# Patient Record
Sex: FEMALE | Race: WHITE | Employment: UNEMPLOYED | URBAN - METROPOLITAN AREA
[De-identification: names, ages, dates, MRNs, and addresses within clinical notes are randomized per-mention and may not be internally consistent; named-entity substitution may affect disease eponyms.]

---

## 2022-08-19 ENCOUNTER — APPOINTMENT (OUTPATIENT)
Dept: GENERAL RADIOLOGY | Age: 22
DRG: 197 | End: 2022-08-19
Attending: EMERGENCY MEDICINE
Payer: MEDICAID

## 2022-08-19 ENCOUNTER — HOSPITAL ENCOUNTER (INPATIENT)
Age: 22
LOS: 7 days | Discharge: HOME HEALTH | DRG: 197 | End: 2022-08-26
Attending: EMERGENCY MEDICINE | Admitting: FAMILY MEDICINE
Payer: MEDICAID

## 2022-08-19 DIAGNOSIS — L03.119 CELLULITIS OF LOWER EXTREMITY, UNSPECIFIED LATERALITY: Primary | ICD-10-CM

## 2022-08-19 DIAGNOSIS — R53.81 DEBILITY: ICD-10-CM

## 2022-08-19 DIAGNOSIS — E66.01 MORBID OBESITY WITH BODY MASS INDEX OF 70 AND OVER IN ADULT (HCC): ICD-10-CM

## 2022-08-19 DIAGNOSIS — S81.809A MULTIPLE OPEN WOUNDS OF LOWER LEG, INITIAL ENCOUNTER: ICD-10-CM

## 2022-08-19 PROBLEM — L03.115 CELLULITIS AND ABSCESS OF RIGHT LEG: Status: ACTIVE | Noted: 2022-08-19

## 2022-08-19 PROBLEM — R73.9 HYPERGLYCEMIA: Status: ACTIVE | Noted: 2022-08-19

## 2022-08-19 PROBLEM — L02.416 CELLULITIS AND ABSCESS OF LEFT LEG: Status: ACTIVE | Noted: 2022-08-19

## 2022-08-19 PROBLEM — F41.9 SEVERE ANXIETY: Status: ACTIVE | Noted: 2022-08-19

## 2022-08-19 PROBLEM — E87.1 HYPONATREMIA: Status: ACTIVE | Noted: 2022-08-19

## 2022-08-19 PROBLEM — L03.116 BILATERAL LOWER LEG CELLULITIS: Status: ACTIVE | Noted: 2022-08-19

## 2022-08-19 PROBLEM — L03.115 BILATERAL LOWER LEG CELLULITIS: Status: ACTIVE | Noted: 2022-08-19

## 2022-08-19 PROBLEM — L03.116 CELLULITIS AND ABSCESS OF LEFT LEG: Status: ACTIVE | Noted: 2022-08-19

## 2022-08-19 PROBLEM — I10 HTN (HYPERTENSION): Status: ACTIVE | Noted: 2022-08-19

## 2022-08-19 PROBLEM — L02.415 CELLULITIS AND ABSCESS OF RIGHT LEG: Status: ACTIVE | Noted: 2022-08-19

## 2022-08-19 PROBLEM — N17.9 AKI (ACUTE KIDNEY INJURY) (HCC): Status: ACTIVE | Noted: 2022-08-19

## 2022-08-19 LAB
ALBUMIN SERPL-MCNC: 2.8 G/DL (ref 3.4–5)
ALBUMIN/GLOB SERPL: 0.5 {RATIO} (ref 0.8–1.7)
ALP SERPL-CCNC: 100 U/L (ref 45–117)
ALT SERPL-CCNC: 57 U/L (ref 13–56)
ANION GAP SERPL CALC-SCNC: 7 MMOL/L (ref 3–18)
AST SERPL-CCNC: 41 U/L (ref 10–38)
BASOPHILS # BLD: 0.1 K/UL (ref 0–0.1)
BASOPHILS NFR BLD: 1 % (ref 0–2)
BILIRUB SERPL-MCNC: 0.5 MG/DL (ref 0.2–1)
BUN SERPL-MCNC: 31 MG/DL (ref 7–18)
BUN/CREAT SERPL: 18 (ref 12–20)
CALCIUM SERPL-MCNC: 9.6 MG/DL (ref 8.5–10.1)
CHLORIDE SERPL-SCNC: 106 MMOL/L (ref 100–111)
CK SERPL-CCNC: 159 U/L (ref 26–192)
CO2 SERPL-SCNC: 20 MMOL/L (ref 21–32)
CREAT SERPL-MCNC: 1.73 MG/DL (ref 0.6–1.3)
DIFFERENTIAL METHOD BLD: ABNORMAL
EOSINOPHIL # BLD: 0.1 K/UL (ref 0–0.4)
EOSINOPHIL NFR BLD: 1 % (ref 0–5)
ERYTHROCYTE [DISTWIDTH] IN BLOOD BY AUTOMATED COUNT: 16.4 % (ref 11.6–14.5)
GLOBULIN SER CALC-MCNC: 5.5 G/DL (ref 2–4)
GLUCOSE SERPL-MCNC: 131 MG/DL (ref 74–99)
HCT VFR BLD AUTO: 34.4 % (ref 35–45)
HGB BLD-MCNC: 10.7 G/DL (ref 12–16)
IMM GRANULOCYTES # BLD AUTO: 0.1 K/UL (ref 0–0.04)
IMM GRANULOCYTES NFR BLD AUTO: 1 % (ref 0–0.5)
LACTATE BLD-SCNC: 1.03 MMOL/L (ref 0.4–2)
LACTATE SERPL-SCNC: 1.8 MMOL/L (ref 0.4–2)
LYMPHOCYTES # BLD: 1.6 K/UL (ref 0.9–3.6)
LYMPHOCYTES NFR BLD: 13 % (ref 21–52)
MCH RBC QN AUTO: 25.7 PG (ref 24–34)
MCHC RBC AUTO-ENTMCNC: 31.1 G/DL (ref 31–37)
MCV RBC AUTO: 82.7 FL (ref 78–100)
MONOCYTES # BLD: 0.7 K/UL (ref 0.05–1.2)
MONOCYTES NFR BLD: 6 % (ref 3–10)
NEUTS SEG # BLD: 9.8 K/UL (ref 1.8–8)
NEUTS SEG NFR BLD: 79 % (ref 40–73)
NRBC # BLD: 0 K/UL (ref 0–0.01)
NRBC BLD-RTO: 0 PER 100 WBC
PHOSPHATE SERPL-MCNC: 4.1 MG/DL (ref 2.5–4.9)
PLATELET # BLD AUTO: 468 K/UL (ref 135–420)
PMV BLD AUTO: 10.1 FL (ref 9.2–11.8)
POTASSIUM SERPL-SCNC: 4.9 MMOL/L (ref 3.5–5.5)
PROT SERPL-MCNC: 8.3 G/DL (ref 6.4–8.2)
RBC # BLD AUTO: 4.16 M/UL (ref 4.2–5.3)
SODIUM SERPL-SCNC: 133 MMOL/L (ref 136–145)
WBC # BLD AUTO: 12.4 K/UL (ref 4.6–13.2)

## 2022-08-19 PROCEDURE — 74011250636 HC RX REV CODE- 250/636: Performed by: EMERGENCY MEDICINE

## 2022-08-19 PROCEDURE — 83605 ASSAY OF LACTIC ACID: CPT

## 2022-08-19 PROCEDURE — 93005 ELECTROCARDIOGRAM TRACING: CPT

## 2022-08-19 PROCEDURE — 80053 COMPREHEN METABOLIC PANEL: CPT

## 2022-08-19 PROCEDURE — 96375 TX/PRO/DX INJ NEW DRUG ADDON: CPT

## 2022-08-19 PROCEDURE — 80061 LIPID PANEL: CPT

## 2022-08-19 PROCEDURE — 82550 ASSAY OF CK (CPK): CPT

## 2022-08-19 PROCEDURE — 84100 ASSAY OF PHOSPHORUS: CPT

## 2022-08-19 PROCEDURE — 73590 X-RAY EXAM OF LOWER LEG: CPT

## 2022-08-19 PROCEDURE — 87205 SMEAR GRAM STAIN: CPT

## 2022-08-19 PROCEDURE — 94762 N-INVAS EAR/PLS OXIMTRY CONT: CPT

## 2022-08-19 PROCEDURE — 65270000029 HC RM PRIVATE

## 2022-08-19 PROCEDURE — 83036 HEMOGLOBIN GLYCOSYLATED A1C: CPT

## 2022-08-19 PROCEDURE — 86141 C-REACTIVE PROTEIN HS: CPT

## 2022-08-19 PROCEDURE — 74011000258 HC RX REV CODE- 258: Performed by: EMERGENCY MEDICINE

## 2022-08-19 PROCEDURE — 99285 EMERGENCY DEPT VISIT HI MDM: CPT

## 2022-08-19 PROCEDURE — 87040 BLOOD CULTURE FOR BACTERIA: CPT

## 2022-08-19 PROCEDURE — 96374 THER/PROPH/DIAG INJ IV PUSH: CPT

## 2022-08-19 PROCEDURE — 71045 X-RAY EXAM CHEST 1 VIEW: CPT

## 2022-08-19 PROCEDURE — 85025 COMPLETE CBC W/AUTO DIFF WBC: CPT

## 2022-08-19 PROCEDURE — 87077 CULTURE AEROBIC IDENTIFY: CPT

## 2022-08-19 PROCEDURE — 87186 SC STD MICRODIL/AGAR DIL: CPT

## 2022-08-19 RX ORDER — CLINDAMYCIN PHOSPHATE 600 MG/50ML
600 INJECTION INTRAVENOUS EVERY 8 HOURS
Status: DISCONTINUED | OUTPATIENT
Start: 2022-08-19 | End: 2022-08-20

## 2022-08-19 RX ORDER — DEXTROSE MONOHYDRATE 100 MG/ML
0-250 INJECTION, SOLUTION INTRAVENOUS AS NEEDED
Status: DISCONTINUED | OUTPATIENT
Start: 2022-08-19 | End: 2022-08-26 | Stop reason: HOSPADM

## 2022-08-19 RX ORDER — ONDANSETRON 2 MG/ML
4 INJECTION INTRAMUSCULAR; INTRAVENOUS
Status: COMPLETED | OUTPATIENT
Start: 2022-08-19 | End: 2022-08-19

## 2022-08-19 RX ORDER — SODIUM CHLORIDE 0.9 % (FLUSH) 0.9 %
5-40 SYRINGE (ML) INJECTION AS NEEDED
Status: DISCONTINUED | OUTPATIENT
Start: 2022-08-19 | End: 2022-08-26 | Stop reason: HOSPADM

## 2022-08-19 RX ORDER — OXYCODONE HYDROCHLORIDE 5 MG/1
10 TABLET ORAL
Status: DISCONTINUED | OUTPATIENT
Start: 2022-08-19 | End: 2022-08-26 | Stop reason: HOSPADM

## 2022-08-19 RX ORDER — ACETAMINOPHEN 325 MG/1
650 TABLET ORAL
Status: DISCONTINUED | OUTPATIENT
Start: 2022-08-19 | End: 2022-08-26 | Stop reason: HOSPADM

## 2022-08-19 RX ORDER — METOPROLOL TARTRATE 25 MG/1
12.5 TABLET, FILM COATED ORAL EVERY 12 HOURS
Status: DISCONTINUED | OUTPATIENT
Start: 2022-08-19 | End: 2022-08-26 | Stop reason: HOSPADM

## 2022-08-19 RX ORDER — POLYETHYLENE GLYCOL 3350 17 G/17G
17 POWDER, FOR SOLUTION ORAL DAILY PRN
Status: DISCONTINUED | OUTPATIENT
Start: 2022-08-19 | End: 2022-08-26 | Stop reason: HOSPADM

## 2022-08-19 RX ORDER — INSULIN LISPRO 100 [IU]/ML
INJECTION, SOLUTION INTRAVENOUS; SUBCUTANEOUS
Status: DISCONTINUED | OUTPATIENT
Start: 2022-08-19 | End: 2022-08-21

## 2022-08-19 RX ORDER — VANCOMYCIN 2 GRAM/500 ML IN 0.9 % SODIUM CHLORIDE INTRAVENOUS
2000 ONCE
Status: COMPLETED | OUTPATIENT
Start: 2022-08-19 | End: 2022-08-20

## 2022-08-19 RX ORDER — ONDANSETRON 2 MG/ML
4 INJECTION INTRAMUSCULAR; INTRAVENOUS
Status: DISCONTINUED | OUTPATIENT
Start: 2022-08-19 | End: 2022-08-26 | Stop reason: HOSPADM

## 2022-08-19 RX ORDER — TRAZODONE HYDROCHLORIDE 50 MG/1
50 TABLET ORAL
Status: DISCONTINUED | OUTPATIENT
Start: 2022-08-19 | End: 2022-08-26 | Stop reason: HOSPADM

## 2022-08-19 RX ORDER — MAGNESIUM SULFATE 100 %
16 CRYSTALS MISCELLANEOUS AS NEEDED
Status: DISCONTINUED | OUTPATIENT
Start: 2022-08-19 | End: 2022-08-26 | Stop reason: HOSPADM

## 2022-08-19 RX ORDER — ACETAMINOPHEN 650 MG/1
650 SUPPOSITORY RECTAL
Status: DISCONTINUED | OUTPATIENT
Start: 2022-08-19 | End: 2022-08-26 | Stop reason: HOSPADM

## 2022-08-19 RX ORDER — MORPHINE SULFATE 4 MG/ML
8 INJECTION INTRAVENOUS ONCE
Status: COMPLETED | OUTPATIENT
Start: 2022-08-19 | End: 2022-08-19

## 2022-08-19 RX ORDER — ONDANSETRON 4 MG/1
4 TABLET, ORALLY DISINTEGRATING ORAL
Status: DISCONTINUED | OUTPATIENT
Start: 2022-08-19 | End: 2022-08-26 | Stop reason: HOSPADM

## 2022-08-19 RX ORDER — ENOXAPARIN SODIUM 100 MG/ML
40 INJECTION SUBCUTANEOUS DAILY
Status: DISCONTINUED | OUTPATIENT
Start: 2022-08-20 | End: 2022-08-20

## 2022-08-19 RX ORDER — HYDRALAZINE HYDROCHLORIDE 20 MG/ML
10 INJECTION INTRAMUSCULAR; INTRAVENOUS
Status: DISCONTINUED | OUTPATIENT
Start: 2022-08-19 | End: 2022-08-26 | Stop reason: HOSPADM

## 2022-08-19 RX ORDER — SODIUM CHLORIDE 9 MG/ML
100 INJECTION, SOLUTION INTRAVENOUS CONTINUOUS
Status: DISCONTINUED | OUTPATIENT
Start: 2022-08-19 | End: 2022-08-26

## 2022-08-19 RX ORDER — SODIUM CHLORIDE 0.9 % (FLUSH) 0.9 %
5-40 SYRINGE (ML) INJECTION EVERY 8 HOURS
Status: DISCONTINUED | OUTPATIENT
Start: 2022-08-19 | End: 2022-08-26 | Stop reason: HOSPADM

## 2022-08-19 RX ORDER — CITALOPRAM 10 MG/1
10 TABLET ORAL DAILY
Status: DISCONTINUED | OUTPATIENT
Start: 2022-08-20 | End: 2022-08-26 | Stop reason: HOSPADM

## 2022-08-19 RX ORDER — MORPHINE SULFATE 2 MG/ML
2 INJECTION, SOLUTION INTRAMUSCULAR; INTRAVENOUS
Status: DISCONTINUED | OUTPATIENT
Start: 2022-08-19 | End: 2022-08-26 | Stop reason: HOSPADM

## 2022-08-19 RX ADMIN — ONDANSETRON 4 MG: 2 INJECTION INTRAMUSCULAR; INTRAVENOUS at 20:44

## 2022-08-19 RX ADMIN — MORPHINE SULFATE 8 MG: 4 INJECTION INTRAVENOUS at 20:41

## 2022-08-19 RX ADMIN — PIPERACILLIN AND TAZOBACTAM 4.5 G: 4; .5 INJECTION, POWDER, FOR SOLUTION INTRAVENOUS at 20:45

## 2022-08-19 RX ADMIN — CLINDAMYCIN IN 5 PERCENT DEXTROSE 600 MG: 12 INJECTION, SOLUTION INTRAVENOUS at 21:30

## 2022-08-19 RX ADMIN — VANCOMYCIN HYDROCHLORIDE 2000 MG: 10 INJECTION, POWDER, LYOPHILIZED, FOR SOLUTION INTRAVENOUS at 22:13

## 2022-08-19 RX ADMIN — SODIUM CHLORIDE 1000 ML: 9 INJECTION, SOLUTION INTRAVENOUS at 20:38

## 2022-08-19 NOTE — ED NOTES
Patient aware of urine sample needed. Unable to void at current time. Will re attempt to collect at a later time. Yu Loving applied at this time.

## 2022-08-19 NOTE — ED NOTES
Verbal shift change report given to Casey Hartley RN (oncoming nurse). Report included the following information SBAR, ED Summary and MAR.

## 2022-08-19 NOTE — Clinical Note
Status[de-identified] INPATIENT [101]   Type of Bed: Medical [8]   Cardiac Monitoring Required?: No   Inpatient Hospitalization Certified Necessary for the Following Reasons: 3.  Patient receiving treatment that can only be provided in an inpatient setting (further clarification in H&P documentation)   Admitting Diagnosis: Cellulitis and abscess of right leg [[id number]]   Admitting Diagnosis: Cellulitis and abscess of left leg [[id number]]   Admitting Physician: Princess Cherry [[id number]]   Attending Physician: Princess Cherry [[id number]]   Estimated Length of Stay: 2 Midnights   Discharge Plan[de-identified] Home with Office Follow-up

## 2022-08-19 NOTE — ED TRIAGE NOTES
Pt arrives to ed bilateral leg pain that has been going on for months now.  Pt states there is sores on her legs that have not been able to heal.

## 2022-08-20 LAB
ALBUMIN SERPL-MCNC: 2 G/DL (ref 3.4–5)
ALBUMIN/GLOB SERPL: 0.4 {RATIO} (ref 0.8–1.7)
ALP SERPL-CCNC: 76 U/L (ref 45–117)
ALT SERPL-CCNC: 37 U/L (ref 13–56)
ANION GAP SERPL CALC-SCNC: 9 MMOL/L (ref 3–18)
AST SERPL-CCNC: 26 U/L (ref 10–38)
BILIRUB SERPL-MCNC: 1.1 MG/DL (ref 0.2–1)
BUN SERPL-MCNC: 21 MG/DL (ref 7–18)
BUN/CREAT SERPL: 19 (ref 12–20)
CALCIUM SERPL-MCNC: 8.4 MG/DL (ref 8.5–10.1)
CHLORIDE SERPL-SCNC: 111 MMOL/L (ref 100–111)
CHOLEST SERPL-MCNC: 235 MG/DL
CO2 SERPL-SCNC: 18 MMOL/L (ref 21–32)
CREAT SERPL-MCNC: 1.09 MG/DL (ref 0.6–1.3)
CRP SERPL HS-MCNC: >9.5 MG/L
ERYTHROCYTE [DISTWIDTH] IN BLOOD BY AUTOMATED COUNT: 16.6 % (ref 11.6–14.5)
GLOBULIN SER CALC-MCNC: 5 G/DL (ref 2–4)
GLUCOSE BLD STRIP.AUTO-MCNC: 103 MG/DL (ref 70–110)
GLUCOSE BLD STRIP.AUTO-MCNC: 109 MG/DL (ref 70–110)
GLUCOSE BLD STRIP.AUTO-MCNC: 113 MG/DL (ref 70–110)
GLUCOSE BLD STRIP.AUTO-MCNC: 80 MG/DL (ref 70–110)
GLUCOSE BLD STRIP.AUTO-MCNC: 97 MG/DL (ref 70–110)
GLUCOSE SERPL-MCNC: 83 MG/DL (ref 74–99)
HBA1C MFR BLD: 5.4 % (ref 4.2–5.6)
HCT VFR BLD AUTO: 29.7 % (ref 35–45)
HDLC SERPL-MCNC: 30 MG/DL (ref 40–60)
HDLC SERPL: 7.8 {RATIO} (ref 0–5)
HGB BLD-MCNC: 8.9 G/DL (ref 12–16)
LDLC SERPL CALC-MCNC: 168 MG/DL (ref 0–100)
LIPID PROFILE,FLP: ABNORMAL
MCH RBC QN AUTO: 25.5 PG (ref 24–34)
MCHC RBC AUTO-ENTMCNC: 30 G/DL (ref 31–37)
MCV RBC AUTO: 85.1 FL (ref 78–100)
NRBC # BLD: 0 K/UL (ref 0–0.01)
NRBC BLD-RTO: 0 PER 100 WBC
PLATELET # BLD AUTO: 352 K/UL (ref 135–420)
PMV BLD AUTO: 9.7 FL (ref 9.2–11.8)
POTASSIUM SERPL-SCNC: 4.4 MMOL/L (ref 3.5–5.5)
PROT SERPL-MCNC: 7 G/DL (ref 6.4–8.2)
RBC # BLD AUTO: 3.49 M/UL (ref 4.2–5.3)
SODIUM SERPL-SCNC: 138 MMOL/L (ref 136–145)
TRIGL SERPL-MCNC: 185 MG/DL (ref ?–150)
VLDLC SERPL CALC-MCNC: 37 MG/DL
WBC # BLD AUTO: 12.7 K/UL (ref 4.6–13.2)

## 2022-08-20 PROCEDURE — 80053 COMPREHEN METABOLIC PANEL: CPT

## 2022-08-20 PROCEDURE — 87252 VIRUS INOCULATION TISSUE: CPT

## 2022-08-20 PROCEDURE — 65270000029 HC RM PRIVATE

## 2022-08-20 PROCEDURE — 74011250637 HC RX REV CODE- 250/637: Performed by: FAMILY MEDICINE

## 2022-08-20 PROCEDURE — 74011250637 HC RX REV CODE- 250/637: Performed by: INTERNAL MEDICINE

## 2022-08-20 PROCEDURE — 82962 GLUCOSE BLOOD TEST: CPT

## 2022-08-20 PROCEDURE — 87389 HIV-1 AG W/HIV-1&-2 AB AG IA: CPT

## 2022-08-20 PROCEDURE — 74011250636 HC RX REV CODE- 250/636: Performed by: FAMILY MEDICINE

## 2022-08-20 PROCEDURE — 74011250636 HC RX REV CODE- 250/636: Performed by: INTERNAL MEDICINE

## 2022-08-20 PROCEDURE — 74011000258 HC RX REV CODE- 258: Performed by: INTERNAL MEDICINE

## 2022-08-20 PROCEDURE — 36415 COLL VENOUS BLD VENIPUNCTURE: CPT

## 2022-08-20 PROCEDURE — 85027 COMPLETE CBC AUTOMATED: CPT

## 2022-08-20 PROCEDURE — 74011000250 HC RX REV CODE- 250: Performed by: FAMILY MEDICINE

## 2022-08-20 PROCEDURE — 74011000258 HC RX REV CODE- 258: Performed by: EMERGENCY MEDICINE

## 2022-08-20 PROCEDURE — 74011250636 HC RX REV CODE- 250/636: Performed by: EMERGENCY MEDICINE

## 2022-08-20 RX ORDER — ENOXAPARIN SODIUM 100 MG/ML
60 INJECTION SUBCUTANEOUS 2 TIMES DAILY
Status: DISCONTINUED | OUTPATIENT
Start: 2022-08-20 | End: 2022-08-22

## 2022-08-20 RX ORDER — NYSTATIN 100000 [USP'U]/G
POWDER TOPICAL 2 TIMES DAILY
Status: DISCONTINUED | OUTPATIENT
Start: 2022-08-20 | End: 2022-08-26 | Stop reason: HOSPADM

## 2022-08-20 RX ADMIN — SODIUM CHLORIDE 3 G: 900 INJECTION INTRAVENOUS at 17:58

## 2022-08-20 RX ADMIN — SODIUM CHLORIDE 100 ML/HR: 9 INJECTION, SOLUTION INTRAVENOUS at 23:19

## 2022-08-20 RX ADMIN — SODIUM CHLORIDE 3 G: 900 INJECTION INTRAVENOUS at 12:57

## 2022-08-20 RX ADMIN — SODIUM CHLORIDE, PRESERVATIVE FREE 10 ML: 5 INJECTION INTRAVENOUS at 21:43

## 2022-08-20 RX ADMIN — SODIUM CHLORIDE, PRESERVATIVE FREE 10 ML: 5 INJECTION INTRAVENOUS at 09:25

## 2022-08-20 RX ADMIN — METOPROLOL TARTRATE 12.5 MG: 25 TABLET, FILM COATED ORAL at 09:23

## 2022-08-20 RX ADMIN — SODIUM CHLORIDE, PRESERVATIVE FREE 10 ML: 5 INJECTION INTRAVENOUS at 03:27

## 2022-08-20 RX ADMIN — CITALOPRAM HYDROBROMIDE 10 MG: 10 TABLET ORAL at 09:23

## 2022-08-20 RX ADMIN — SODIUM CHLORIDE 100 ML/HR: 9 INJECTION, SOLUTION INTRAVENOUS at 04:23

## 2022-08-20 RX ADMIN — PIPERACILLIN AND TAZOBACTAM 4.5 G: 4; .5 INJECTION, POWDER, FOR SOLUTION INTRAVENOUS at 03:27

## 2022-08-20 RX ADMIN — SODIUM CHLORIDE, PRESERVATIVE FREE 10 ML: 5 INJECTION INTRAVENOUS at 14:40

## 2022-08-20 RX ADMIN — VANCOMYCIN HYDROCHLORIDE 1000 MG: 1 INJECTION, POWDER, LYOPHILIZED, FOR SOLUTION INTRAVENOUS at 09:29

## 2022-08-20 RX ADMIN — ENOXAPARIN SODIUM: 100 INJECTION SUBCUTANEOUS at 20:30

## 2022-08-20 RX ADMIN — NYSTATIN: 100000 POWDER TOPICAL at 12:58

## 2022-08-20 RX ADMIN — SODIUM CHLORIDE 100 ML/HR: 9 INJECTION, SOLUTION INTRAVENOUS at 06:36

## 2022-08-20 RX ADMIN — ENOXAPARIN SODIUM 40 MG: 100 INJECTION SUBCUTANEOUS at 09:24

## 2022-08-20 RX ADMIN — CLINDAMYCIN IN 5 PERCENT DEXTROSE 600 MG: 12 INJECTION, SOLUTION INTRAVENOUS at 04:16

## 2022-08-20 RX ADMIN — NYSTATIN: 100000 POWDER TOPICAL at 20:29

## 2022-08-20 RX ADMIN — PIPERACILLIN AND TAZOBACTAM 4.5 G: 4; .5 INJECTION, POWDER, FOR SOLUTION INTRAVENOUS at 09:23

## 2022-08-20 RX ADMIN — SODIUM CHLORIDE 3 G: 900 INJECTION INTRAVENOUS at 23:18

## 2022-08-20 RX ADMIN — METOPROLOL TARTRATE 12.5 MG: 25 TABLET, FILM COATED ORAL at 02:17

## 2022-08-20 RX ADMIN — METOPROLOL TARTRATE: 25 TABLET, FILM COATED ORAL at 20:29

## 2022-08-20 NOTE — H&P
History & Physical    Patient: Annika Burton MRN: [id number]  CSN: [id number]    YOB: 2000  Age: 25 y.o. Sex: female      DOA: 8/19/2022  Primary Care Provider:  None      Assessment/Plan     Patient Active Problem List   Diagnosis Code    Super-super obese ([facility] E66.01    Bilateral lower leg cellulitis L03.116, L03.115    Hyperglycemia R73.9    Hyponatremia E87.1    GUCCI (acute kidney injury) ([facility] N17.9    Severe anxiety F41.9    HTN (hypertension) I10    Multiple open wounds of lower leg, initial encounter S80.5A     20-year-old female with super morbid obesity and severe anxiety who has difficulty leaving her house is admitted for bilateral lower leg cellulitis.       Bilateral lower leg cellulitis with multiple open wounds of her legs-see images in separate progress note  -Vancomycin and Zosyn for antibiotic treatment  --Wound care consult  --Echocardiogram to assess ejection fraction given marked leg swelling  --Duplex dopplers of the lower extremities to rule out DVT  --Follow up blood and wound cultures    GUCCI-likely due to infection  - IV fluid hydration, trend creatinine  -Avoid nephrotoxins    Severe anxiety-she does not leave her house  -Case management consult for home health wound care    Hypertension  -Start Lopressor twice daily    Hyperglycemia-suspect diabetes  -Follow-up hemoglobin A1c  - Sliding scale insulin    Hyponatremia  --Follow trend    Super morbid obesity-BMI 84  -Bariatric surgery recommended  -Sleep study recommended to evaluate for sleep apnea    Full code    Prophylaxis: lovenox SQ    Estimated length of stay : 3 nights    Yasmany De MD  Nocturnist  -----------------------------------------------------------------------------------------------------------------------------------------------------------------------------------  CC: weeping leg wounds       HPI:     Annika Burton is a 25 y.o. female with super morbid obesity and severe anxiety who has difficulty leaving her house presents to the ED with her sister for weeping leg wounds for the past several months. They are painful and she has had increased redness. She lives with her mother. Her sister has been trying to clean the wounds with diluted bleach. She has not been to see a doctor in 3 years. She denies fever, cough, or nausea/vomiting. Past Medical History:   Diagnosis Date    Severe anxiety 2022    Super-super obese ([address]        History reviewed. No pertinent surgical history. Family History   Problem Relation Age of Onset    Hypertension Mother     Anxiety disorder Mother        Social History     Socioeconomic History    Marital status: SINGLE       Prior to Admission medications    Not on File       No Known Allergies    Review of Systems  Gen: No fever, chills, malaise, weight loss/gain. Heent: No headache, rhinorrhea, sore throat. Heart: No chest pain, palpitations, SAMPSON, pnd, or orthopnea. Resp: No cough, hemoptysis, wheezing and shortness of breath. GI: No nausea, vomiting, diarrhea, constipation, melena or hematochezia. : No urinary obstruction, dysuria or hematuria. Derm: Weeping leg wounds  Musc/skeletal: no bone or joint complaints. Vasc: +lower leg edema, cyanosis or claudication. Endo: No heat/cold intolerance, no polyuria,polydipsia or polyphagia. Neuro: No unilateral weakness, numbness, tingling. No seizures. Heme: No easy bruising or bleeding. Physical Exam:     Physical Exam:  Visit Vitals  BP (!) 160/58   Pulse (!) 110   Temp 98.1 °F (36.7 °C)   Resp 14   Ht 5' 6\" (1.676 m)   Wt (!) 237.4 kg (523 lb 7 oz)   SpO2 100%   BMI 84.49 kg/m²           Temp (24hrs), Av.1 °F (36.7 °C), Min:98.1 °F (36.7 °C), Max:98.1 °F (36.7 °C)    No intake/output data recorded. No intake/output data recorded. General:  Awake, cooperative, no distress. Head:  Normocephalic, without obvious abnormality, atraumatic.    Eyes:  Conjunctivae/corneas clear, sclera anicteric. Neck: Supple, symmetrical, trachea midline. Lungs:   Clear to auscultation bilaterally. Heart:  Tachycardic, regular rhythm, S1, S2 normal, no murmur, click, rub or gallop. Abdomen: Soft, very obese, nontender. Bowel sounds normal. No masses,  No organomegaly. Extremities: Extremities with severe swelling below the knees with erythema and multiple weeping open wounds. Capillary refill normal.   Pulses: 2+ and symmetric all extremities. Skin: Skin color pink, turgor normal. Multiple weeping leg wounds   Neurologic: No focal motor or sensory deficit. Labs Reviewed: All lab results for the last 24 hours reviewed. Recent Results (from the past 24 hour(s))   EKG, 12 LEAD, INITIAL    Collection Time: 08/19/22  6:20 PM   Result Value Ref Range    Ventricular Rate 118 BPM    Atrial Rate 118 BPM    P-R Interval 132 ms    QRS Duration 90 ms    Q-T Interval 308 ms    QTC Calculation (Bezet) 431 ms    Calculated P Axis 33 degrees    Calculated R Axis 58 degrees    Calculated T Axis 29 degrees    Diagnosis       Sinus tachycardia  Otherwise normal ECG  No previous ECGs available     CBC WITH AUTOMATED DIFF    Collection Time: 08/19/22  6:54 PM   Result Value Ref Range    WBC 12.4 4.6 - 13.2 K/uL    RBC 4.16 (L) 4.20 - 5.30 M/uL    HGB 10.7 (L) 12.0 - 16.0 g/dL    HCT 34.4 (L) 35.0 - 45.0 %    MCV 82.7 78.0 - 100.0 FL    MCH 25.7 24.0 - 34.0 PG    MCHC 31.1 31.0 - 37.0 g/dL    RDW 16.4 (H) 11.6 - 14.5 %    PLATELET 121 (H) 682 - 420 K/uL    MPV 10.1 9.2 - 11.8 FL    NRBC 0.0 0  WBC    ABSOLUTE NRBC 0.00 0.00 - 0.01 K/uL    NEUTROPHILS 79 (H) 40 - 73 %    LYMPHOCYTES 13 (L) 21 - 52 %    MONOCYTES 6 3 - 10 %    EOSINOPHILS 1 0 - 5 %    BASOPHILS 1 0 - 2 %    IMMATURE GRANULOCYTES 1 (H) 0.0 - 0.5 %    ABS. NEUTROPHILS 9.8 (H) 1.8 - 8.0 K/UL    ABS. LYMPHOCYTES 1.6 0.9 - 3.6 K/UL    ABS. MONOCYTES 0.7 0.05 - 1.2 K/UL    ABS. EOSINOPHILS 0.1 0.0 - 0.4 K/UL    ABS.  BASOPHILS 0.1 0.0 - 0.1 K/UL    ABS. IMM. GRANS. 0.1 (H) 0.00 - 0.04 K/UL    DF AUTOMATED     METABOLIC PANEL, COMPREHENSIVE    Collection Time: 08/19/22  6:54 PM   Result Value Ref Range    Sodium 133 (L) 136 - 145 mmol/L    Potassium 4.9 3.5 - 5.5 mmol/L    Chloride 106 100 - 111 mmol/L    CO2 20 (L) 21 - 32 mmol/L    Anion gap 7 3.0 - 18 mmol/L    Glucose 131 (H) 74 - 99 mg/dL    BUN 31 (H) 7.0 - 18 MG/DL    Creatinine 1.73 (H) 0.6 - 1.3 MG/DL    BUN/Creatinine ratio 18 12 - 20      GFR est AA 45 (L) >60 ml/min/1.73m2    GFR est non-AA 37 (L) >60 ml/min/1.73m2    Calcium 9.6 8.5 - 10.1 MG/DL    Bilirubin, total 0.5 0.2 - 1.0 MG/DL    ALT (SGPT) 57 (H) 13 - 56 U/L    AST (SGOT) 41 (H) 10 - 38 U/L    Alk. phosphatase 100 45 - 117 U/L    Protein, total 8.3 (H) 6.4 - 8.2 g/dL    Albumin 2.8 (L) 3.4 - 5.0 g/dL    Globulin 5.5 (H) 2.0 - 4.0 g/dL    A-G Ratio 0.5 (L) 0.8 - 1.7     LACTIC ACID    Collection Time: 08/19/22  6:54 PM   Result Value Ref Range    Lactic acid 1.8 0.4 - 2.0 MMOL/L   CK    Collection Time: 08/19/22  6:54 PM   Result Value Ref Range     26 - 192 U/L   PHOSPHORUS    Collection Time: 08/19/22  6:54 PM   Result Value Ref Range    Phosphorus 4.1 2.5 - 4.9 MG/DL   POC LACTIC ACID    Collection Time: 08/19/22  7:25 PM   Result Value Ref Range    Lactic Acid (POC) 1.03 0.40 - 2.00 mmol/L     Results  XR TIB/FIB LT (Accession [id number]) (Order [id number])  Allergies       No Known Allergies     Exam Information    Status Exam Begun  Exam Ended    Final [99] 8/19/2022 21:30 8/19/2022 10:10 PM [id number] 10:10 PM     Result Information    Status: Final result (Exam End: 8/19/2022 22:10) Provider Status: Open       XR TIB/FIB LT: Patient Communication     Released  Not seen     Study Result    Narrative & Impression   EXAM: 2 views of bilateral tibia fibula     CLINICAL INDICATION/HISTORY: Bilateral leg pain     COMPARISON: None.     _______________     FINDINGS:      No fracture or dislocation.  Degenerative changes in the knees. Patient is  morbidly obese with diffuse soft tissue edema and reticulation involving both  lower legs. Diffuse mottling of the soft tissues bilaterally with patchy  lucencies identified. _______________     IMPRESSION     1. Morbid obesity with diffuse soft tissue edema and reticulation involving both  lower legs. Mottled appearance of the soft tissues with patchy lucencies  bilaterally. Difficult to know if this relates to soft tissue gas, lower  extremity wounds, and/or mottled appearance from extensive edema. Results  XR TIB/FIB RT (Accession [id number]) (Order [id number])  Allergies       No Known Allergies     Exam Information    Status Exam Begun  Exam Ended    Final [99] 8/19/2022 21:30 8/19/2022 10:10 PM [id number] 10:10 PM     Result Information    Status: Final result (Exam End: 8/19/2022 22:10) Provider Status: Open       XR TIB/FIB RT: Patient Communication     Released  Not seen     Study Result    Narrative & Impression   EXAM: 2 views of bilateral tibia fibula     CLINICAL INDICATION/HISTORY: Bilateral leg pain     COMPARISON: None.     _______________     FINDINGS:      No fracture or dislocation. Degenerative changes in the knees. Patient is  morbidly obese with diffuse soft tissue edema and reticulation involving both  lower legs. Diffuse mottling of the soft tissues bilaterally with patchy  lucencies identified. _______________     IMPRESSION     1. Morbid obesity with diffuse soft tissue edema and reticulation involving both  lower legs. Mottled appearance of the soft tissues with patchy lucencies  bilaterally. Difficult to know if this relates to soft tissue gas, lower  extremity wounds, and/or mottled appearance from extensive edema.

## 2022-08-20 NOTE — ED PROVIDER NOTES
EMERGENCY DEPARTMENT HISTORY AND PHYSICAL EXAM    Date: 8/19/2022  Patient Name: Kelley Sewell    History of Presenting Illness     Chief Complaint   Patient presents with    Leg Pain         History Provided By: Patient    Additional History (Context):   8:10 PM  Kelley Sewell is a 25 y.o. female with PMHX of morbid obesity who presents to the emergency department C/O leg infections. Patient presents with her family who delivers most of her history. They state she has been lying in bed with worsening pain and swelling to both lower extremities now with open wounds weeping and malodor. She has never been seen or evaluated this before. She denies any fevers although she does have chills. They been putting bandages but they are becoming failed and wet. There is been no vomiting or diarrhea. She denies any chest pain or cough. She is never had a blood clot before in the past.    Social History  Denies smoking drinking or drugs    Family History  Negative family history for autoimmune disease or clotting disorders.       PCP: None    Current Facility-Administered Medications   Medication Dose Route Frequency Provider Last Rate Last Admin    clindamycin (CLEOCIN) 600mg D5W 50mL IVPB (premix)  600 mg IntraVENous Q8H Meseret Godoy MD   IV Completed at 08/20/22 0444    piperacillin-tazobactam (ZOSYN) 4.5 g in 0.9% sodium chloride (MBP/ADV) 100 mL MBP  4.5 g IntraVENous Q6H eMseret Godoy MD   IV Completed at 08/20/22 0400    Vancomycin - Pharmacy to Dose  1 Each Other Rx Dosing/Monitoring Meseret Godoy MD        morphine injection 2 mg  2 mg IntraVENous Q4H PRN Julian Ruiz MD        oxyCODONE IR (ROXICODONE) tablet 10 mg  10 mg Oral Q6H PRN Julian Ruiz MD        citalopram (CELEXA) tablet 10 mg  10 mg Oral DAILY Julian Ruiz MD        traZODone (DESYREL) tablet 50 mg  50 mg Oral QHS PRN Julian Ruiz MD        sodium chloride (NS) flush 5-40 mL  5-40 mL IntraVENous Q8H Shanna Perez MD   10 mL at 08/20/22 0327    sodium chloride (NS) flush 5-40 mL  5-40 mL IntraVENous PRN Shanna Perez MD        acetaminophen (TYLENOL) tablet 650 mg  650 mg Oral Q6H PRN Shanna Perez MD        Or    acetaminophen (TYLENOL) suppository 650 mg  650 mg Rectal Q6H PRN Shanna Perez MD        polyethylene glycol (MIRALAX) packet 17 g  17 g Oral DAILY PRN Shanna Perez MD        ondansetron (ZOFRAN ODT) tablet 4 mg  4 mg Oral Q8H PRN Shanna Perez MD        Or    [facility]ron [facility] injection 4 mg  4 mg IntraVENous Q6H PRN Shanna Perez MD        enoxaparin (LOVENOX) injection 40 mg  40 mg SubCUTAneous DAILY Shanna Perez MD        hydrALAZINE (APRESOLINE) 20 mg/mL injection 10 mg  10 mg IntraVENous Q6H PRN Shanna Perez MD        insulin lispro (HUMALOG) injection   SubCUTAneous AC&HS Shanna Perez MD        glucose chewable tablet 16 g  16 g Oral PRN Shanna Perez MD        glucagon (GLUCAGEN) injection 1 mg  1 mg IntraMUSCular PRN David LUDWIG MD        dextrose 10% infusion 0-250 mL  0-250 mL IntraVENous PRN Shanna Perez MD        0.9% sodium chloride infusion  100 mL/hr IntraVENous CONTINUOUS David LUDWIG  mL/hr at 08/20/22 0423 100 mL/hr at 08/20/22 0423    vancomycin (VANCOCIN) 1,000 mg in 0.9% sodium chloride 250 mL (VIAL-MATE)  1,000 mg IntraVENous Q12H Higinio Zhou MD        metoprolol tartrate (LOPRESSOR) tablet 12.5 mg  12.5 mg Oral Q12H Shanna Perez MD   12.5 mg at 08/20/22 0217       Past History     Past Medical History:  Past Medical History:   Diagnosis Date    Severe anxiety 08/19/2022    Super-super obese [facility])        Past Surgical History:  History reviewed. No pertinent surgical history.     Family History:  Family History   Problem Relation Age of Onset    Hypertension Mother     Anxiety disorder Mother        Social History: Allergies:  No Known Allergies      Review of Systems   Review of Systems   Constitutional:  Positive for activity change, chills and fatigue. Skin:  Positive for wound. Physical Exam     Vitals:    08/19/22 2034 08/19/22 2104 08/20/22 0217 08/20/22 0359   BP: (!) 142/70 (!) 160/58 (!) 137/59 (!) 124/50   Pulse: (!) 113 (!) 110 (!) 107 (!) 103   Resp: 20 14  18   Temp:       SpO2: 100% 100%  97%   Weight:       Height:         Physical Exam  Vitals and nursing note reviewed. Constitutional:       General: She is not in acute distress. Appearance: She is well-developed. She is morbidly obese. She is toxic-appearing. She is not diaphoretic. Comments: Massive morbid obesity     HENT:      Head: Normocephalic and atraumatic. Eyes:      General: No scleral icterus. Extraocular Movements:      Right eye: Normal extraocular motion. Left eye: Normal extraocular motion. Conjunctiva/sclera: Conjunctivae normal.      Pupils: Pupils are equal, round, and reactive to light. Neck:      Trachea: No tracheal deviation. Cardiovascular:      Rate and Rhythm: Regular rhythm. Tachycardia present. Heart sounds: Normal heart sounds. Pulmonary:      Effort: Pulmonary effort is normal. No respiratory distress. Breath sounds: No stridor. Comments: Crackles in both dependent bases. Abdominal:      General: Bowel sounds are normal. There is no distension. Palpations: Abdomen is soft. Tenderness: There is no abdominal tenderness. There is no rebound. Musculoskeletal:         General: No tenderness. Normal range of motion. Cervical back: Normal range of motion and neck supple. Comments: Grossly unremarkable without abnormalities   Skin:     General: Skin is warm and dry. Capillary Refill: Capillary refill takes less than 2 seconds. Findings: No erythema or rash.       Comments: Extensive erosions in both lower extremities with deeper regions of problems to the lateral compartments of both lower extremities. There is prominent malodor to these areas. Wound cultures were taken. Neurological:      Mental Status: She is alert and oriented to person, place, and time. GCS: GCS eye subscore is 4. GCS verbal subscore is 5. GCS motor subscore is 6. Cranial Nerves: No cranial nerve deficit. Motor: No weakness. Coordination: Coordination is intact. Psychiatric:         Mood and Affect: Mood normal.         Behavior: Behavior normal.         Thought Content: Thought content normal.         Judgment: Judgment normal.       Diagnostic Study Results     Labs -  Recent Results (from the past 24 hour(s))   EKG, 12 LEAD, INITIAL    Collection Time: 08/19/22  6:20 PM   Result Value Ref Range    Ventricular Rate 118 BPM    Atrial Rate 118 BPM    P-R Interval 132 ms    QRS Duration 90 ms    Q-T Interval 308 ms    QTC Calculation (Bezet) 431 ms    Calculated P Axis 33 degrees    Calculated R Axis 58 degrees    Calculated T Axis 29 degrees    Diagnosis       Sinus tachycardia  Otherwise normal ECG  No previous ECGs available     CBC WITH AUTOMATED DIFF    Collection Time: 08/19/22  6:54 PM   Result Value Ref Range    WBC 12.4 4.6 - 13.2 K/uL    RBC 4.16 (L) 4.20 - 5.30 M/uL    HGB 10.7 (L) 12.0 - 16.0 g/dL    HCT 34.4 (L) 35.0 - 45.0 %    MCV 82.7 78.0 - 100.0 FL    MCH 25.7 24.0 - 34.0 PG    MCHC 31.1 31.0 - 37.0 g/dL    RDW 16.4 (H) 11.6 - 14.5 %    PLATELET 945 (H) 757 - 420 K/uL    MPV 10.1 9.2 - 11.8 FL    NRBC 0.0 0  WBC    ABSOLUTE NRBC 0.00 0.00 - 0.01 K/uL    NEUTROPHILS 79 (H) 40 - 73 %    LYMPHOCYTES 13 (L) 21 - 52 %    MONOCYTES 6 3 - 10 %    EOSINOPHILS 1 0 - 5 %    BASOPHILS 1 0 - 2 %    IMMATURE GRANULOCYTES 1 (H) 0.0 - 0.5 %    ABS. NEUTROPHILS 9.8 (H) 1.8 - 8.0 K/UL    ABS. LYMPHOCYTES 1.6 0.9 - 3.6 K/UL    ABS. MONOCYTES 0.7 0.05 - 1.2 K/UL    ABS. EOSINOPHILS 0.1 0.0 - 0.4 K/UL    ABS. BASOPHILS 0.1 0.0 - 0.1 K/UL    ABS. IMM. GRANS. 0.1 (H) 0.00 - 0.04 K/UL    DF AUTOMATED     METABOLIC PANEL, COMPREHENSIVE    Collection Time: 08/19/22  6:54 PM   Result Value Ref Range    Sodium 133 (L) 136 - 145 mmol/L    Potassium 4.9 3.5 - 5.5 mmol/L    Chloride 106 100 - 111 mmol/L    CO2 20 (L) 21 - 32 mmol/L    Anion gap 7 3.0 - 18 mmol/L    Glucose 131 (H) 74 - 99 mg/dL    BUN 31 (H) 7.0 - 18 MG/DL    Creatinine 1.73 (H) 0.6 - 1.3 MG/DL    BUN/Creatinine ratio 18 12 - 20      GFR est AA 45 (L) >60 ml/min/1.73m2    GFR est non-AA 37 (L) >60 ml/min/1.73m2    Calcium 9.6 8.5 - 10.1 MG/DL    Bilirubin, total 0.5 0.2 - 1.0 MG/DL    ALT (SGPT) 57 (H) 13 - 56 U/L    AST (SGOT) 41 (H) 10 - 38 U/L    Alk. phosphatase 100 45 - 117 U/L    Protein, total 8.3 (H) 6.4 - 8.2 g/dL    Albumin 2.8 (L) 3.4 - 5.0 g/dL    Globulin 5.5 (H) 2.0 - 4.0 g/dL    A-G Ratio 0.5 (L) 0.8 - 1.7     LACTIC ACID    Collection Time: 08/19/22  6:54 PM   Result Value Ref Range    Lactic acid 1.8 0.4 - 2.0 MMOL/L   CK    Collection Time: 08/19/22  6:54 PM   Result Value Ref Range     26 - 192 U/L   PHOSPHORUS    Collection Time: 08/19/22  6:54 PM   Result Value Ref Range    Phosphorus 4.1 2.5 - 4.9 MG/DL   POC LACTIC ACID    Collection Time: 08/19/22  7:25 PM   Result Value Ref Range    Lactic Acid (POC) 1.03 0.40 - 2.00 mmol/L   GLUCOSE, POC    Collection Time: 08/20/22  2:12 AM   Result Value Ref Range    Glucose (POC) 103 70 - 110 mg/dL        Radiologic Studies -   Preliminary findings  Lower extremity soft tissue x-rays bilateral.  There are scattered areas of edema but no grossly visible subcutaneous gas. There is no crepitus during examination despite that this is necrotizing fasciitis. .  Final radiology report pending  Sacha Earl MD        XR TIB/FIB LT   Final Result      1. Morbid obesity with diffuse soft tissue edema and reticulation involving both   lower legs. Mottled appearance of the soft tissues with patchy lucencies   bilaterally.  Difficult to know if this relates to soft tissue gas, lower   extremity wounds, and/or mottled appearance from extensive edema. XR TIB/FIB RT   Final Result      1. Morbid obesity with diffuse soft tissue edema and reticulation involving both   lower legs. Mottled appearance of the soft tissues with patchy lucencies   bilaterally. Difficult to know if this relates to soft tissue gas, lower   extremity wounds, and/or mottled appearance from extensive edema. XR CHEST PORT   Final Result      No active cardiopulmonary disease. DUPLEX LOWER EXT VENOUS BILAT    (Results Pending)     CT Results  (Last 48 hours)      None          CXR Results  (Last 48 hours)                 08/19/22 1953  XR CHEST PORT Final result    Impression:      No active cardiopulmonary disease. Narrative:  EXAM: CHEST RADIOGRAPH       CLINICAL INDICATION/HISTORY: sepsis     > Additional: Leg wounds, bilateral leg pain       COMPARISON: None       TECHNIQUE: Portable frontal view of the chest       _______________       FINDINGS:       SUPPORT DEVICES: None. HEART AND MEDIASTINUM: Heart size is normal.       LUNGS AND PLEURAL SPACES: No focal consolidation, effusion, or pneumothorax.        BONES AND SOFT TISSUES: Unremarkable.       _______________                   Medications given in the ED-  Medications   clindamycin (CLEOCIN) 600mg D5W 50mL IVPB (premix) (0 mg IntraVENous IV Completed 8/20/22 0444)   piperacillin-tazobactam (ZOSYN) 4.5 g in 0.9% sodium chloride (MBP/ADV) 100 mL MBP (0 g IntraVENous IV Completed 8/20/22 0400)   Vancomycin - Pharmacy to Dose (has no administration in time range)   morphine injection 2 mg (has no administration in time range)   oxyCODONE IR (ROXICODONE) tablet 10 mg (has no administration in time range)   citalopram (CELEXA) tablet 10 mg (has no administration in time range)   traZODone (DESYREL) tablet 50 mg (has no administration in time range)   sodium chloride (NS) flush 5-40 mL (10 mL IntraVENous Given 8/20/22 0327)   sodium chloride (NS) flush 5-40 mL (has no administration in time range)   acetaminophen (TYLENOL) tablet 650 mg (has no administration in time range)     Or   acetaminophen (TYLENOL) suppository 650 mg (has no administration in time range)   polyethylene glycol (MIRALAX) packet 17 g (has no administration in time range)   ondansetron (ZOFRAN ODT) tablet 4 mg (has no administration in time range)     Or   ondansetron (ZOFRAN) injection 4 mg (has no administration in time range)   enoxaparin (LOVENOX) injection 40 mg (has no administration in time range)   hydrALAZINE (APRESOLINE) 20 mg/mL injection 10 mg (has no administration in time range)   insulin lispro (HUMALOG) injection (0 Units SubCUTAneous Held 8/19/22 2200)   glucose chewable tablet 16 g (has no administration in time range)   glucagon (GLUCAGEN) injection 1 mg (has no administration in time range)   dextrose 10% infusion 0-250 mL (has no administration in time range)   0.9% sodium chloride infusion (100 mL/hr IntraVENous New Bag 8/20/22 0423)   vancomycin (VANCOCIN) 1,000 mg in 0.9% sodium chloride 250 mL (VIAL-MATE) (has no administration in time range)   metoprolol tartrate (LOPRESSOR) tablet 12.5 mg (12.5 mg Oral Given 8/20/22 0217)   morphine injection 8 mg (8 mg IntraVENous Given 8/19/22 2041)   ondansetron (ZOFRAN) injection 4 mg (4 mg IntraVENous Given 8/19/22 2044)   sodium chloride 0.9 % bolus infusion 1,000 mL (1,000 mL IntraVENous New Bag 8/19/22 2038)   vancomycin (VANCOCIN) 2000 mg in  ml infusion (2,000 mg IntraVENous New Bag 8/19/22 2213)         Medical Decision Making   I am the first provider for this patient. I reviewed the vital signs, available nursing notes, past medical history, past surgical history, family history and social history. Vital Signs-Reviewed the patient's vital signs.     Pulse Oximetry Analysis -100% on room air    Cardiac Monitor:  Rate: 118 bpm  Rhythm: Sinus tachycardia    EKG interpretation: (Preliminary)  6:20 PM    Sinus tachycardia, rate 118, normal ST segments, QTC is 431. EKG read by Janet Alaniz MD      Records Reviewed: NURSING NOTES AND PREVIOUS MEDICAL RECORDS    Provider Notes (Medical Decision Making): Morbidly obese patient presents with significant malodor and open wounds and drainage in both lower extremities. There is extensive redness punctate lesions consistent with cellulitis and early abscess drainage. Given this her morbid obesity likely this is associated with debility as there is significant atrophy to both lower calves and both feet. Highly unlikely she is ambulatory in any way. She is too obese to perform CT scans but will use x-rays to look for evidence of significant subcutaneous tissue. No clear evidence could be seen with this. Lactic acid and white count was without evidence of crisis. I asked the hospitalist to come see the patient's legs before we wrapped him. She was given medications for necrotizing lesion although this is a striking appearance and odor of Pseudomonas. These lesions are most likely polymicrobial.  Wounds will be dressed and patient can be admitted to the floor. Procedures:  Procedures    ED Course:   6:20 AM: Initial assessment performed. The patients presenting problems have been discussed, and they are in agreement with the care plan formulated and outlined with them. I have encouraged them to ask questions as they arise throughout their visit. CONSULT NOTE:   8:45 PM   Janet Alaniz MD   spoke with Dr. Stone  Specialty: Hospitalist  Discussed pt's hx, disposition, and available diagnostic and imaging results  over the telephone. Reviewed care plans. Consulting physician agrees with plans as outlined. Diagnosis and Disposition       Critical Care Time: 0 minutes    Core Measures:  For Hospitalized Patients:    1.  Hospitalization Decision Time:  The decision to hospitalize the patient was made by Alia Smith MD   at 8:20 PM on 8/19/2022    2. Aspirin: Aspirin was not given because the patient is a bleeding risk    9:30 PM  Patient is being admitted to the hospital by Dr. Kiera Cota. The results of their tests and reasons for their admission have been discussed with them and/or available family. They convey agreement and understanding for the need to be admitted and for their admission diagnosis. CONDITIONS ON ADMISSION:  Sepsis is not present at the time of admission. Urinary Tract Infection is not present at the time of admission. Pneumonia is not present at the time of admission. Wound infection is present at the time of admission. CLINICAL IMPRESSION:    1. Cellulitis of lower extremity, unspecified laterality    2. Morbid obesity with body mass index of 70 and over in adult [facility])    3. Debility          _______________________________    This note was partially transcribed via voice recognition software. Although efforts have been made to catch any discrepancies, it may contain sound alike words, grammatical errors, or nonsensical words.

## 2022-08-20 NOTE — PROGRESS NOTES
Pharmacist Review and Automatic Dose Adjustment of Prophylactic Enoxaparin    *Review reason for admission/hospital problem list*    The reviewing pharmacist has made an adjustment to the ordered enoxaparin dose or converted to UFH per the approved [facility]  protocol and table as identified below. Gwyn Manzano is a 25 y.o. female. No lab exists for component: CREATININE    Estimated Creatinine Clearance: 168.2 mL/min (based on SCr of 1.09 mg/dL).     Height:   Ht Readings from Last 1 Encounters:   08/19/22 167.6 cm (66\")     Weight:  Wt Readings from Last 1 Encounters:   08/19/22 (!) 237.4 kg (523 lb 7 oz)               Plan: Based upon the patient's weight and renal function, the ordered enoxaparin dose of 40 mg SQ daily has been changed/converted to Lovenox 60 mg SQ bid      Thank you,  Janay Khoury, [facility]

## 2022-08-20 NOTE — CONSULTS
Brief Afar/ID note. Woof. Hard to tell from [place] looking at the pictures. I'd get Wound Care involved. No urgency/emergency for IV abx, but I swept up the triple abx (vanco/clinda/pip-tzb) to something simple. KISS. Trend over time/CRP (I ordered)  Dr Colonel Wilcox will be in Monday to see her.     Glen Aguilar MD  Cell [phone]  [facility]

## 2022-08-20 NOTE — PROGRESS NOTES
[facility] Pharmacokinetic Monitoring Service - Vancomycin     Mamta Johnson is a 25 y.o. female starting on vancomycin therapy for SSTI. Pharmacy consulted by Dr. Anneliese Palomo for monitoring and adjustment. Target Concentration: Goal AUC/TYLOR 400-600 mg*hr/L    Additional Antimicrobials: Zosyn    Pertinent Laboratory Values: Wt Readings from Last 1 Encounters:   08/19/22 (!) 237.4 kg (523 lb 7 oz)     Temp Readings from Last 1 Encounters:   08/19/22 98.1 °F (36.7 °C)     No components found for: PROCAL  Estimated Creatinine Clearance: 106 mL/min (A) (based on SCr of 1.73 mg/dL (H)).   Recent Labs     08/19/22  1854   WBC 12.4       Plan:  Dosing recommendations based on Bayesian software  Start vancomycin 2000 mgIV , then 1000 mg IV q12hrs  Anticipated AUC of 502 mg/l.hr and trough concentration of 14.5 mg/l at steady state  Renal labs as indicated   Pharmacy will continue to monitor patient and adjust therapy as indicated    BARRON Corbett [facility]  8/19/2022 9:07 PM   [phone]

## 2022-08-20 NOTE — PROGRESS NOTES
Pt in bariatric bed with fiance present in room. Pads under bilateral legs changed-patient with large amount of serous drainage and bleeding from multiple wounds on lower legs. Pt able to move side to side with assistance of trapeze.

## 2022-08-20 NOTE — PROGRESS NOTES
Hospitalist Progress Note    Patient: Elieser Eldridge MRN: [id number]  CSN: [id number]    YOB: 2000  Age: 25 y.o.   Sex: female    DOA: 8/19/2022 LOS:  LOS: 1 day          Chief Complaint:          Assessment/Plan   Bilateral lower leg cellulitis with multiple open wounds of her legs-see images in separate progress note  -Vancomycin and Zosyn Clidamycin for antibiotic treatment  --Wound care consult  --Echocardiogram to assess ejection fraction given marked leg swelling  --Duplex dopplers of the lower extremities to rule out DVT  --Follow up blood and wound cultures  Add viral panel HIV and get ID consult     GUCCI-likely due to infection and Nsaid use  - IV fluid hydration, trend creatinine  -Avoid nephrotoxins     Severe anxiety-she does not leave her house  -Case management consult for home health wound care     Hypertension  -Lopressor twice daily started     Hyperglycemia-suspect diabetes  -Follow-up hemoglobin A1c  - Sliding scale insulin     Hyponatremia  --Follow trend should improve with fluids      Super morbid obesity-BMI 84  -Bariatric surgery recommended  -Sleep study recommended to evaluate for sleep apnea      Patient Active Problem List   Diagnosis Code    [facility] E66.01    Bilateral lower leg cellulitis L03.116, L03.115    Hyperglycemia R73.9    Hyponatremia E87.1    GUCCI (acute kidney injury) ([facility] N17.9    Severe anxiety F41.9    HTN (hypertension) I10    Multiple open wounds of lower leg, initial encounter S81.890Y       Subjective:    Feels better no pain  Normally uses NSAID otc  Has fiance monogomous relationship  Wounds worse over 3 months    No monkey pox contacts  Review of systems:    Constitutional: denies fevers, chills, myalgias  Respiratory: denies SOB, cough  Cardiovascular: denies chest pain, palpitations  Gastrointestinal: denies nausea, vomiting, diarrhea      Vital signs/Intake and Output:  Visit Vitals  BP (!) 112/55   Pulse 99   Temp 98.8 °F (37.1 °C)   Resp 18   Ht 5' 6\" (1.676 m)   Wt (!) 237.4 kg (523 lb 7 oz)   SpO2 99%   Breastfeeding No   BMI 84.49 kg/m²     Current Shift:  08/20 0701 - 08/20 1900  In: -   Out: 1000 [Urine:1000]  Last three shifts:  No intake/output data recorded. Exam:    GeneralMorbidly obese  Head/Neck: NCAT, supple, No masses, No lymphadenopathy  CVS:Regular rate and rhythm, no M/R/G, S1/S2 heard, no thrill  Lungs:Clear to auscultation bilaterally, no wheezes, rhonchi, or rales  Abdomen: large panus on menstrual cycle  Extremities: No C/C/E, pulses palpable 2+  Skin:        Neuro:grossly normal , follows commands  Psych:appropriate                Labs: Results:       Chemistry Recent Labs     08/19/22 1854   *   *   K 4.9      CO2 20*   BUN 31*   CREA 1.73*   CA 9.6   AGAP 7   BUCR 18      TP 8.3*   ALB 2.8*   GLOB 5.5*   AGRAT 0.5*      CBC w/Diff Recent Labs     08/19/22 1854   WBC 12.4   RBC 4.16*   HGB 10.7*   HCT 34.4*   *   GRANS 79*   LYMPH 13*   EOS 1      Cardiac Enzymes Recent Labs     08/19/22 1854         Coagulation No results for input(s): PTP, INR, APTT, INREXT in the last 72 hours. Lipid Panel No results found for: CHOL, CHOLPOCT, CHOLX, CHLST, CHOLV, 550273, HDL, HDLP, LDL, LDLC, DLDLP, 447035, VLDLC, VLDL, TGLX, TRIGL, TRIGP, TGLPOCT, CHHD, CHHDX   BNP No results for input(s): BNPP in the last 72 hours.    Liver Enzymes Recent Labs     08/19/22 1854   TP 8.3*   ALB 2.8*         Thyroid Studies No results found for: T4, T3U, TSH, TSHEXT     Procedures/imaging: see electronic medical records for all procedures/Xrays and details which were not copied into this note but were reviewed prior to creation of Tito Tiwari MD

## 2022-08-20 NOTE — PROGRESS NOTES
Lower legs cleansed with wound cleanser and soap and H2O large amount of serous drainage, wounds with foul odor and bleeding. Legs inflamed. Nystatin powder applied to pannus.

## 2022-08-21 ENCOUNTER — APPOINTMENT (OUTPATIENT)
Dept: CT IMAGING | Age: 22
DRG: 197 | End: 2022-08-21
Attending: INTERNAL MEDICINE
Payer: MEDICAID

## 2022-08-21 ENCOUNTER — APPOINTMENT (OUTPATIENT)
Dept: NON INVASIVE DIAGNOSTICS | Age: 22
DRG: 197 | End: 2022-08-21
Attending: FAMILY MEDICINE
Payer: MEDICAID

## 2022-08-21 LAB
ALBUMIN SERPL-MCNC: 1.7 G/DL (ref 3.4–5)
ALBUMIN/GLOB SERPL: 0.3 {RATIO} (ref 0.8–1.7)
ALP SERPL-CCNC: 69 U/L (ref 45–117)
ALT SERPL-CCNC: 31 U/L (ref 13–56)
ANION GAP SERPL CALC-SCNC: 9 MMOL/L (ref 3–18)
AST SERPL-CCNC: 34 U/L (ref 10–38)
ATRIAL RATE: 118 BPM
BILIRUB SERPL-MCNC: 0.5 MG/DL (ref 0.2–1)
BUN SERPL-MCNC: 12 MG/DL (ref 7–18)
BUN/CREAT SERPL: 18 (ref 12–20)
CALCIUM SERPL-MCNC: 8.3 MG/DL (ref 8.5–10.1)
CALCULATED P AXIS, ECG09: 33 DEGREES
CALCULATED R AXIS, ECG10: 58 DEGREES
CALCULATED T AXIS, ECG11: 29 DEGREES
CHLORIDE SERPL-SCNC: 115 MMOL/L (ref 100–111)
CO2 SERPL-SCNC: 15 MMOL/L (ref 21–32)
CREAT SERPL-MCNC: 0.67 MG/DL (ref 0.6–1.3)
DIAGNOSIS, 93000: NORMAL
GLOBULIN SER CALC-MCNC: 4.9 G/DL (ref 2–4)
GLUCOSE BLD STRIP.AUTO-MCNC: 80 MG/DL (ref 70–110)
GLUCOSE BLD STRIP.AUTO-MCNC: 87 MG/DL (ref 70–110)
GLUCOSE SERPL-MCNC: 77 MG/DL (ref 74–99)
P-R INTERVAL, ECG05: 132 MS
POTASSIUM SERPL-SCNC: 4.6 MMOL/L (ref 3.5–5.5)
PROT SERPL-MCNC: 6.6 G/DL (ref 6.4–8.2)
Q-T INTERVAL, ECG07: 308 MS
QRS DURATION, ECG06: 90 MS
QTC CALCULATION (BEZET), ECG08: 431 MS
SODIUM SERPL-SCNC: 139 MMOL/L (ref 136–145)
VENTRICULAR RATE, ECG03: 118 BPM

## 2022-08-21 PROCEDURE — 82962 GLUCOSE BLOOD TEST: CPT

## 2022-08-21 PROCEDURE — 93306 TTE W/DOPPLER COMPLETE: CPT

## 2022-08-21 PROCEDURE — 74011250636 HC RX REV CODE- 250/636: Performed by: INTERNAL MEDICINE

## 2022-08-21 PROCEDURE — 80053 COMPREHEN METABOLIC PANEL: CPT

## 2022-08-21 PROCEDURE — P9047 ALBUMIN (HUMAN), 25%, 50ML: HCPCS | Performed by: INTERNAL MEDICINE

## 2022-08-21 PROCEDURE — 74011000258 HC RX REV CODE- 258: Performed by: INTERNAL MEDICINE

## 2022-08-21 PROCEDURE — 36415 COLL VENOUS BLD VENIPUNCTURE: CPT

## 2022-08-21 PROCEDURE — 74011250636 HC RX REV CODE- 250/636: Performed by: FAMILY MEDICINE

## 2022-08-21 PROCEDURE — 74011000250 HC RX REV CODE- 250: Performed by: FAMILY MEDICINE

## 2022-08-21 PROCEDURE — 65270000029 HC RM PRIVATE

## 2022-08-21 PROCEDURE — 74011250637 HC RX REV CODE- 250/637: Performed by: FAMILY MEDICINE

## 2022-08-21 PROCEDURE — 74011250637 HC RX REV CODE- 250/637: Performed by: INTERNAL MEDICINE

## 2022-08-21 RX ORDER — CALCIUM CARBONATE 500(1250)
500 TABLET ORAL
Status: DISCONTINUED | OUTPATIENT
Start: 2022-08-21 | End: 2022-08-26 | Stop reason: HOSPADM

## 2022-08-21 RX ORDER — ALBUMIN HUMAN 250 G/1000ML
12.5 SOLUTION INTRAVENOUS EVERY 6 HOURS
Status: DISCONTINUED | OUTPATIENT
Start: 2022-08-21 | End: 2022-08-26

## 2022-08-21 RX ADMIN — SODIUM CHLORIDE 3 G: 900 INJECTION INTRAVENOUS at 17:32

## 2022-08-21 RX ADMIN — METOPROLOL TARTRATE 12.5 MG: 25 TABLET, FILM COATED ORAL at 21:28

## 2022-08-21 RX ADMIN — SODIUM CHLORIDE, PRESERVATIVE FREE 10 ML: 5 INJECTION INTRAVENOUS at 21:33

## 2022-08-21 RX ADMIN — ENOXAPARIN SODIUM 60 MG: 100 INJECTION SUBCUTANEOUS at 08:48

## 2022-08-21 RX ADMIN — NYSTATIN: 100000 POWDER TOPICAL at 21:33

## 2022-08-21 RX ADMIN — SODIUM CHLORIDE 100 ML/HR: 9 INJECTION, SOLUTION INTRAVENOUS at 05:55

## 2022-08-21 RX ADMIN — SODIUM CHLORIDE 3 G: 900 INJECTION INTRAVENOUS at 23:08

## 2022-08-21 RX ADMIN — CALCIUM 500 MG: 500 TABLET ORAL at 17:32

## 2022-08-21 RX ADMIN — SODIUM CHLORIDE 3 G: 900 INJECTION INTRAVENOUS at 05:40

## 2022-08-21 RX ADMIN — NYSTATIN: 100000 POWDER TOPICAL at 08:51

## 2022-08-21 RX ADMIN — SODIUM CHLORIDE, PRESERVATIVE FREE 10 ML: 5 INJECTION INTRAVENOUS at 14:40

## 2022-08-21 RX ADMIN — CALCIUM 500 MG: 500 TABLET ORAL at 13:32

## 2022-08-21 RX ADMIN — CITALOPRAM HYDROBROMIDE 10 MG: 10 TABLET ORAL at 08:48

## 2022-08-21 RX ADMIN — METOPROLOL TARTRATE 12.5 MG: 25 TABLET, FILM COATED ORAL at 08:47

## 2022-08-21 RX ADMIN — SODIUM CHLORIDE 3 G: 900 INJECTION INTRAVENOUS at 12:40

## 2022-08-21 RX ADMIN — ALBUMIN (HUMAN) 12.5 G: 0.25 INJECTION, SOLUTION INTRAVENOUS at 20:05

## 2022-08-21 RX ADMIN — ENOXAPARIN SODIUM 60 MG: 100 INJECTION SUBCUTANEOUS at 21:28

## 2022-08-21 RX ADMIN — ALBUMIN (HUMAN) 12.5 G: 0.25 INJECTION, SOLUTION INTRAVENOUS at 14:35

## 2022-08-21 RX ADMIN — SODIUM CHLORIDE, PRESERVATIVE FREE 10 ML: 5 INJECTION INTRAVENOUS at 05:41

## 2022-08-21 RX ADMIN — OXYCODONE 10 MG: 5 TABLET ORAL at 13:32

## 2022-08-21 NOTE — PROGRESS NOTES
Problem: Pressure Injury - Risk of  Goal: *Prevention of pressure injury  Description: Document Bryon Scale and appropriate interventions in the flowsheet. Outcome: Progressing Towards Goal  Note: Pressure Injury Interventions:       Moisture Interventions: Absorbent underpads, Assess need for specialty bed, Contain wound drainage, Internal/External urinary devices    Activity Interventions: Trapeze to reposition, Assess need for specialty bed    Mobility Interventions: Assess need for specialty bed, Pressure redistribution bed/mattress (bed type), PT/OT evaluation, Trapeze to reposition    Nutrition Interventions: Document food/fluid/supplement intake    Friction and Shear Interventions: HOB 30 degrees or less, Lift sheet, Trapeze to reposition, Minimize layers                Problem: Patient Education: Go to Patient Education Activity  Goal: Patient/Family Education  Outcome: Progressing Towards Goal     Problem: Falls - Risk of  Goal: *Absence of Falls  Description: Document Garner Fall Risk and appropriate interventions in the flowsheet.   Outcome: Progressing Towards Goal  Note: Fall Risk Interventions:  Mobility Interventions: Strengthening exercises (ROM-active/passive)         Medication Interventions: Bed/chair exit alarm, Teach patient to arise slowly    Elimination Interventions: Call light in reach    History of Falls Interventions: Door open when patient unattended, Room close to nurse's station

## 2022-08-21 NOTE — PROGRESS NOTES
Hospitalist Progress Note    Patient: Tawanda Viveros MRN: [id number]  CSN: [id number]    YOB: 2000  Age: 25 y.o.   Sex: female    DOA: 8/19/2022 LOS:  LOS: 2 days          Chief Complaint:          Assessment/Plan   Bilateral lower leg cellulitis with multiple open wounds of her legs-see images in separate progress note  Now on UnasynIV  --Wound care consult tomorrow  --Echocardiogram to assess ejection fraction given marked leg swelling  --Duplex dopplers of the lower extremities to rule out DVT  --Follow up blood and wound cultures  Add viral panel HIV  heavy mix of bacteria      GUCCI-likely due to infection and Nsaid use  As well as diuretic use  Stop fluids has improved  -Avoid nephrotoxins    Low albumin  Start iv  Check prealbumin     Severe anxiety-she does not leave her house  -Case management consult for home health wound care     Hypertension  -Lopressor twice daily started     Hyperglycemia-no DM  -Follow-up hemoglobin A1c normal  - Sliding scale insulin stopped     Hyponatremia  Improved was on diuettics        Super morbid obesity-BMI 84  -Bariatric surgery recommended  -Sleep study recommended to evaluate for sleep apnea      Patient Active Problem List   Diagnosis Code    Super-super obese ([facility] E66.01    Bilateral lower leg cellulitis L03.116, L03.115    Hyperglycemia R73.9    Hyponatremia E87.1    GUCCI (acute kidney injury) ([facility] N17.9    Severe anxiety F41.9    HTN (hypertension) I10    Multiple open wounds of lower leg, initial encounter S81.418D       Subjective:    Feels better no pain  Normally uses NSAID otc and otc diuretics   Has fiance monogomous relationship  Wounds worse over 3 months    Blood sugar and creatine improve  Mixed bacteria wound cultures    Review of systems:    Constitutional: denies fevers, chills, myalgias  Respiratory: denies SOB, cough  Cardiovascular: denies chest pain, palpitations  Gastrointestinal: denies nausea, vomiting, diarrhea      Vital signs/Intake and Output:  Visit Vitals  BP (!) 127/52 (BP 1 Location: Left lower arm, BP Patient Position: At rest)   Pulse 88   Temp 99.1 °F (37.3 °C)   Resp 19   Ht 5' 6\" (1.676 m)   Wt (!) 237.7 kg (524 lb)   SpO2 95%   Breastfeeding No   BMI 84.58 kg/m²     Current Shift:  No intake/output data recorded. Last three shifts:  08/19 1901 - 08/21 0700  In: 1200 [I.V.:1200]  Out: 1801 [Urine:1800]    Exam:    GeneralMorbidly obese  Head/Neck: NCAT, supple, No masses, No lymphadenopathy  CVS:Regular rate and rhythm, no M/R/G, S1/S2 heard, no thrill  Lungs:Clear to auscultation bilaterally, no wheezes, rhonchi, or rales  Abdomen: large panus on menstrual cycle  Extremities: No C/C/E, pulses palpable 2+  Skin:        Neuro:grossly normal , follows commands  Psych:appropriate                Labs: Results:       Chemistry Recent Labs     08/21/22  0801 08/20/22  1250 08/19/22  1854   GLU 77 83 131*    138 133*   K 4.6 4.4 4.9   * 111 106   CO2 15* 18* 20*   BUN 12 21* 31*   CREA 0.67 1.09 1.73*   CA 8.3* 8.4* 9.6   AGAP 9 9 7   BUCR 18 19 18   AP 69 76 100   TP 6.6 7.0 8.3*   ALB 1.7* 2.0* 2.8*   GLOB 4.9* 5.0* 5.5*   AGRAT 0.3* 0.4* 0.5*        CBC w/Diff Recent Labs     08/20/22  1250 08/19/22  1854   WBC 12.7 12.4   RBC 3.49* 4.16*   HGB 8.9* 10.7*   HCT 29.7* 34.4*    468*   GRANS  --  79*   LYMPH  --  13*   EOS  --  1        Cardiac Enzymes Recent Labs     08/19/22  1854           Coagulation No results for input(s): PTP, INR, APTT, INREXT, INREXT in the last 72 hours. Lipid Panel Lab Results   Component Value Date/Time    Cholesterol, total 235 (H) 08/19/2022 06:54 PM    HDL Cholesterol 30 (L) 08/19/2022 06:54 PM    LDL, calculated 168 (H) 08/19/2022 06:54 PM    VLDL, calculated 37 08/19/2022 06:54 PM    Triglyceride 185 (H) 08/19/2022 06:54 PM    CHOL/HDL Ratio 7.8 (H) 08/19/2022 06:54 PM      BNP No results for input(s): BNPP in the last 72 hours.    Liver Enzymes Recent Labs 08/21/22  0801   TP 6.6   ALB 1.7*   AP 69        Thyroid Studies No results found for: T4, T3U, TSH, TSHEXT, TSHEXT     Procedures/imaging: see electronic medical records for all procedures/Xrays and details which were not copied into this note but were reviewed prior to creation of Tyler Neville MD

## 2022-08-22 ENCOUNTER — APPOINTMENT (OUTPATIENT)
Dept: VASCULAR SURGERY | Age: 22
DRG: 197 | End: 2022-08-22
Attending: FAMILY MEDICINE
Payer: MEDICAID

## 2022-08-22 LAB
ALBUMIN SERPL-MCNC: 2 G/DL (ref 3.4–5)
ALBUMIN/GLOB SERPL: 0.4 {RATIO} (ref 0.8–1.7)
ALP SERPL-CCNC: 78 U/L (ref 45–117)
ALT SERPL-CCNC: 41 U/L (ref 13–56)
ANION GAP SERPL CALC-SCNC: 7 MMOL/L (ref 3–18)
AST SERPL-CCNC: 45 U/L (ref 10–38)
BILIRUB SERPL-MCNC: 0.4 MG/DL (ref 0.2–1)
BUN SERPL-MCNC: 10 MG/DL (ref 7–18)
BUN/CREAT SERPL: 13 (ref 12–20)
CALCIUM SERPL-MCNC: 8.8 MG/DL (ref 8.5–10.1)
CHLORIDE SERPL-SCNC: 111 MMOL/L (ref 100–111)
CO2 SERPL-SCNC: 21 MMOL/L (ref 21–32)
CREAT SERPL-MCNC: 0.75 MG/DL (ref 0.6–1.3)
ECHO AO ASC DIAM: 2.9 CM
ECHO AO ASCENDING AORTA INDEX: 0.96 CM/M2
ECHO AV AREA PEAK VELOCITY: 2.7 CM2
ECHO AV AREA PEAK VELOCITY: 2.8 CM2
ECHO AV AREA VTI: 3 CM2
ECHO AV AREA/BSA VTI: 1 CM2/M2
ECHO AV MEAN GRADIENT: 8 MMHG
ECHO AV MEAN VELOCITY: 1.3 M/S
ECHO AV PEAK GRADIENT: 16 MMHG
ECHO AV PEAK VELOCITY: 2 M/S
ECHO AV VTI: 35 CM
ECHO LA DIAMETER INDEX: 1.26 CM/M2
ECHO LA DIAMETER: 3.8 CM
ECHO LA VOL 2C: 27 ML (ref 22–52)
ECHO LA VOL 4C: 57 ML (ref 22–52)
ECHO LA VOL BP: 46 ML (ref 22–52)
ECHO LA VOL/BSA BIPLANE: 15 ML/M2 (ref 16–34)
ECHO LA VOLUME AREA LENGTH: 50 ML
ECHO LA VOLUME INDEX A2C: 9 ML/M2 (ref 16–34)
ECHO LA VOLUME INDEX A4C: 19 ML/M2 (ref 16–34)
ECHO LA VOLUME INDEX AREA LENGTH: 17 ML/M2 (ref 16–34)
ECHO LV E' LATERAL VELOCITY: 15 CM/S
ECHO LV E' SEPTAL VELOCITY: 15 CM/S
ECHO LV FRACTIONAL SHORTENING: 38 % (ref 28–44)
ECHO LV INTERNAL DIMENSION DIASTOLE INDEX: 1.93 CM/M2
ECHO LV INTERNAL DIMENSION DIASTOLIC: 5.8 CM (ref 3.9–5.3)
ECHO LV INTERNAL DIMENSION SYSTOLIC INDEX: 1.2 CM/M2
ECHO LV INTERNAL DIMENSION SYSTOLIC: 3.6 CM
ECHO LV IVSD: 0.9 CM (ref 0.6–0.9)
ECHO LV MASS 2D: 203.5 G (ref 67–162)
ECHO LV MASS INDEX 2D: 67.6 G/M2 (ref 43–95)
ECHO LV POSTERIOR WALL DIASTOLIC: 0.9 CM (ref 0.6–0.9)
ECHO LV RELATIVE WALL THICKNESS RATIO: 0.31
ECHO LVOT AREA: 4.2 CM2
ECHO LVOT AV VTI INDEX: 0.73
ECHO LVOT DIAM: 2.3 CM
ECHO LVOT MEAN GRADIENT: 4 MMHG
ECHO LVOT PEAK GRADIENT: 7 MMHG
ECHO LVOT PEAK GRADIENT: 7 MMHG
ECHO LVOT PEAK VELOCITY: 1.3 M/S
ECHO LVOT PEAK VELOCITY: 1.4 M/S
ECHO LVOT STROKE VOLUME INDEX: 35 ML/M2
ECHO LVOT SV: 105.5 ML
ECHO LVOT VTI: 25.4 CM
ECHO MV A VELOCITY: 0.64 M/S
ECHO MV E DECELERATION TIME (DT): 117 MS
ECHO MV E VELOCITY: 1.18 M/S
ECHO MV E/A RATIO: 1.84
ECHO MV E/E' LATERAL: 7.87
ECHO MV E/E' RATIO (AVERAGED): 7.87
ECHO MV E/E' SEPTAL: 7.87
ECHO PV MAX VELOCITY: 1.4 M/S
ECHO PV MEAN GRADIENT: 4 MMHG
ECHO PV MEAN VELOCITY: 1 M/S
ECHO PV PEAK GRADIENT: 8 MMHG
ECHO RV FREE WALL PEAK S': 21 CM/S
ECHO RV TAPSE: 2.7 CM (ref 1.7–?)
ERYTHROCYTE [DISTWIDTH] IN BLOOD BY AUTOMATED COUNT: 16.5 % (ref 11.6–14.5)
GLOBULIN SER CALC-MCNC: 4.9 G/DL (ref 2–4)
GLUCOSE SERPL-MCNC: 77 MG/DL (ref 74–99)
HCT VFR BLD AUTO: 28.8 % (ref 35–45)
HGB BLD-MCNC: 8.7 G/DL (ref 12–16)
HIV 1+2 AB+HIV1 P24 AG SERPL QL IA: NONREACTIVE
HIV12 RESULT COMMENT, HHIVC: NORMAL
MCH RBC QN AUTO: 26.5 PG (ref 24–34)
MCHC RBC AUTO-ENTMCNC: 30.2 G/DL (ref 31–37)
MCV RBC AUTO: 87.8 FL (ref 78–100)
NRBC # BLD: 0 K/UL (ref 0–0.01)
NRBC BLD-RTO: 0 PER 100 WBC
PLATELET # BLD AUTO: 340 K/UL (ref 135–420)
PMV BLD AUTO: 9.8 FL (ref 9.2–11.8)
POTASSIUM SERPL-SCNC: 4.2 MMOL/L (ref 3.5–5.5)
PROT SERPL-MCNC: 6.9 G/DL (ref 6.4–8.2)
RBC # BLD AUTO: 3.28 M/UL (ref 4.2–5.3)
SODIUM SERPL-SCNC: 139 MMOL/L (ref 136–145)
WBC # BLD AUTO: 10.2 K/UL (ref 4.6–13.2)

## 2022-08-22 PROCEDURE — 74011250636 HC RX REV CODE- 250/636: Performed by: INTERNAL MEDICINE

## 2022-08-22 PROCEDURE — 84134 ASSAY OF PREALBUMIN: CPT

## 2022-08-22 PROCEDURE — 74011250637 HC RX REV CODE- 250/637: Performed by: FAMILY MEDICINE

## 2022-08-22 PROCEDURE — 85027 COMPLETE CBC AUTOMATED: CPT

## 2022-08-22 PROCEDURE — 74011250636 HC RX REV CODE- 250/636: Performed by: FAMILY MEDICINE

## 2022-08-22 PROCEDURE — 74011000258 HC RX REV CODE- 258: Performed by: INTERNAL MEDICINE

## 2022-08-22 PROCEDURE — 74011000250 HC RX REV CODE- 250: Performed by: FAMILY MEDICINE

## 2022-08-22 PROCEDURE — 77010033678 HC OXYGEN DAILY

## 2022-08-22 PROCEDURE — 65270000029 HC RM PRIVATE

## 2022-08-22 PROCEDURE — 74011250637 HC RX REV CODE- 250/637: Performed by: INTERNAL MEDICINE

## 2022-08-22 PROCEDURE — P9047 ALBUMIN (HUMAN), 25%, 50ML: HCPCS | Performed by: INTERNAL MEDICINE

## 2022-08-22 PROCEDURE — 93970 EXTREMITY STUDY: CPT

## 2022-08-22 PROCEDURE — 36415 COLL VENOUS BLD VENIPUNCTURE: CPT

## 2022-08-22 PROCEDURE — 80053 COMPREHEN METABOLIC PANEL: CPT

## 2022-08-22 RX ORDER — AMOXICILLIN AND CLAVULANATE POTASSIUM 875; 125 MG/1; MG/1
1 TABLET, FILM COATED ORAL EVERY 12 HOURS
Status: COMPLETED | OUTPATIENT
Start: 2022-08-22 | End: 2022-08-25

## 2022-08-22 RX ORDER — ENOXAPARIN SODIUM 100 MG/ML
40 INJECTION SUBCUTANEOUS EVERY 24 HOURS
Status: DISCONTINUED | OUTPATIENT
Start: 2022-08-23 | End: 2022-08-22

## 2022-08-22 RX ORDER — ENOXAPARIN SODIUM 100 MG/ML
40 INJECTION SUBCUTANEOUS EVERY 24 HOURS
Status: DISCONTINUED | OUTPATIENT
Start: 2022-08-23 | End: 2022-08-26 | Stop reason: HOSPADM

## 2022-08-22 RX ADMIN — OXYCODONE 10 MG: 5 TABLET ORAL at 08:29

## 2022-08-22 RX ADMIN — METOPROLOL TARTRATE 12.5 MG: 25 TABLET, FILM COATED ORAL at 08:29

## 2022-08-22 RX ADMIN — ALBUMIN (HUMAN) 12.5 G: 0.25 INJECTION, SOLUTION INTRAVENOUS at 11:17

## 2022-08-22 RX ADMIN — CALCIUM 500 MG: 500 TABLET ORAL at 12:27

## 2022-08-22 RX ADMIN — SODIUM CHLORIDE, PRESERVATIVE FREE 10 ML: 5 INJECTION INTRAVENOUS at 14:34

## 2022-08-22 RX ADMIN — ENOXAPARIN SODIUM 60 MG: 100 INJECTION SUBCUTANEOUS at 08:29

## 2022-08-22 RX ADMIN — METOPROLOL TARTRATE 12.5 MG: 25 TABLET, FILM COATED ORAL at 20:52

## 2022-08-22 RX ADMIN — ALBUMIN (HUMAN) 12.5 G: 0.25 INJECTION, SOLUTION INTRAVENOUS at 18:31

## 2022-08-22 RX ADMIN — NYSTATIN: 100000 POWDER TOPICAL at 21:00

## 2022-08-22 RX ADMIN — ALBUMIN (HUMAN) 12.5 G: 0.25 INJECTION, SOLUTION INTRAVENOUS at 02:21

## 2022-08-22 RX ADMIN — AMOXICILLIN AND CLAVULANATE POTASSIUM 1 TABLET: 875; 125 TABLET, FILM COATED ORAL at 20:51

## 2022-08-22 RX ADMIN — SODIUM CHLORIDE 3 G: 900 INJECTION INTRAVENOUS at 12:24

## 2022-08-22 RX ADMIN — ALBUMIN (HUMAN) 12.5 G: 0.25 INJECTION, SOLUTION INTRAVENOUS at 22:59

## 2022-08-22 RX ADMIN — CALCIUM 500 MG: 500 TABLET ORAL at 08:29

## 2022-08-22 RX ADMIN — CITALOPRAM HYDROBROMIDE 10 MG: 10 TABLET ORAL at 08:29

## 2022-08-22 RX ADMIN — SODIUM CHLORIDE, PRESERVATIVE FREE 10 ML: 5 INJECTION INTRAVENOUS at 22:59

## 2022-08-22 RX ADMIN — CALCIUM 500 MG: 500 TABLET ORAL at 18:30

## 2022-08-22 RX ADMIN — OXYCODONE 10 MG: 5 TABLET ORAL at 18:32

## 2022-08-22 RX ADMIN — SODIUM CHLORIDE 3 G: 900 INJECTION INTRAVENOUS at 05:26

## 2022-08-22 NOTE — CONSULTS
[facility]  (A Division of [address] [place] [address])                                                                                                                      Collin Perdue MD  Office #: - Option # [phone]  Fax #: [phone]     Date of Admission: 8/19/2022Date of Note: 8/22/2022      Reason for Referral: Evaluation and antibiotic management of BL cellulitis    Thank you for involving me in the care of this patient. Please do not hesitate to contact me on the above number if question or concern. Current Antimicrobials:    Prior Antimicrobials:    Unasyn 8/20 to date   Vanco/Zosyn/Clinda-- 8/19 to 8/20       Assessment- ID related:  ----------------------------------------------------------------------    Doubt cellulitis  B/L leg wounds- extensive skin loss- posterior leg-> with bleeding from ulcers at this time  Underlying venous stasis/ lymphedeam  Morbidly obese-- BMI of 84! Recommendation for ID issues I am following:  ---------------------------------------------------------------------------FU BCX: NGSF  --FU WCX-- S.aurues/ GNR --henrik wound colonizers    Ok with brief abx oral-> Augmentin 875mg PO BID to complete 2-3 more days    Treatment of wound with wound care/skin care  DW wound team         HPI:  Pooja Gomez is a 25 y.o. WHITE/NON- with PMH as listed below--morbidly obese with underlying skin wound for few months. She was getting wound care provided by her sister. She report she started to feel pain few weeks ago and was getting worse, which is the reason she came to ED on 8/19. Denies fever, chills, rigors, shortness of breath or cough. No nausea, vomiting, abdominal pain, diarrhea, rash. No dysuria. Currently on Unasyn. Wound exam done by wound team-- saw her during that time.            Active Hospital Problems    Diagnosis Date Noted    Super-super obese ([facility] 08/19/2022    Bilateral lower leg cellulitis 08/19/2022    Hyperglycemia 08/19/2022    Hyponatremia 08/19/2022    GUCCI (acute kidney injury) ([facility] 08/19/2022    Severe anxiety 08/19/2022    HTN (hypertension) 08/19/2022    Multiple open wounds of lower leg, initial encounter 08/19/2022     Past Medical History:   Diagnosis Date    Severe anxiety 08/19/2022    Super-super obese ([facility] [place] [facility]      History reviewed. No pertinent surgical history. Family History   Problem Relation Age of Onset    Hypertension Mother     Anxiety disorder Mother      Social History     Socioeconomic History    Marital status: SINGLE     Spouse name: Not on file    Number of children: Not on file    Years of education: Not on file    Highest education level: Not on file   Occupational History    Not on file   Tobacco Use    Smoking status: Not on file    Smokeless tobacco: Not on file   Substance and Sexual Activity    Alcohol use: Not on file    Drug use: Not on file    Sexual activity: Not on file   Other Topics Concern    Not on file   Social History Narrative    Not on file     Social Determinants of Health     Financial Resource Strain: Not on file   Food Insecurity: Not on file   Transportation Needs: Not on file   Physical Activity: Not on file   Stress: Not on file   Social Connections: Not on file   Intimate Partner Violence: Not on file   Housing Stability: Not on file       Allergies:  Patient has no known allergies.      Medications:  Current Facility-Administered Medications   Medication Dose Route Frequency    [START ON 8/23/2022] enoxaparin (LOVENOX) injection 40 mg  40 mg SubCUTAneous Q24H    albumin human 25% (BUMINATE) solution 12.5 g  12.5 g IntraVENous Q6H    calcium carbonate (OS-ZHEN) tablet 500 mg [elemental]  500 mg Oral TID WITH MEALS    nystatin (MYCOSTATIN) 100,000 unit/gram powder   Topical BID    ampicillin-sulbactam (UNASYN) 3 g in 0.9% sodium chloride (MBP/ADV) 100 mL MBP  3 g IntraVENous Q6H    morphine injection 2 mg  2 mg IntraVENous Q4H PRN oxyCODONE IR (ROXICODONE) tablet 10 mg  10 mg Oral Q6H PRN    citalopram (CELEXA) tablet 10 mg  10 mg Oral DAILY    traZODone (DESYREL) tablet 50 mg  50 mg Oral QHS PRN    sodium chloride (NS) flush 5-40 mL  5-40 mL IntraVENous Q8H    sodium chloride (NS) flush 5-40 mL  5-40 mL IntraVENous PRN    acetaminophen (TYLENOL) tablet 650 mg  650 mg Oral Q6H PRN    Or    acetaminophen (TYLENOL) suppository 650 mg  650 mg Rectal Q6H PRN    polyethylene glycol (MIRALAX) packet 17 g  17 g Oral DAILY PRN    ondansetron (ZOFRAN ODT) tablet 4 mg  4 mg Oral Q8H PRN    Or    ondansetron (ZOFRAN) injection 4 mg  4 mg IntraVENous Q6H PRN    hydrALAZINE (APRESOLINE) 20 mg/mL injection 10 mg  10 mg IntraVENous Q6H PRN    glucose chewable tablet 16 g  16 g Oral PRN    glucagon (GLUCAGEN) injection 1 mg  1 mg IntraMUSCular PRN    dextrose 10% infusion 0-250 mL  0-250 mL IntraVENous PRN    [Held by provider] 0.9% sodium chloride infusion  100 mL/hr IntraVENous CONTINUOUS    metoprolol tartrate (LOPRESSOR) tablet 12.5 mg  12.5 mg Oral Q12H        ROS:  Pertinent items are noted in the History of Present Illness. She reports to be ambulating at home         Physical Exam:    Temp (24hrs), Av.6 °F (37 °C), Min:98.1 °F (36.7 °C), Max:99.1 °F (37.3 °C)    Visit Vitals  /65 (BP 1 Location: Right lower arm, BP Patient Position: At rest)   Pulse 93   Temp 98.7 °F (37.1 °C)   Resp 20   Ht 5' 6\" (1.676 m)   Wt (!) 237.7 kg (524 lb)   SpO2 100%   Breastfeeding No   BMI 84.58 kg/m²          GEN: WD Morbidly Obese, not in resp distress. HEENT: Unicteric. EOMI intact  --no neck swelling  CHEST: Non laboured breathing. CTA  CVS:RRR, no mur/gallop  ABD: Obese/soft. Non tender. Morbidly obese  SO: Purwick in place  EXT: No apparent swelling or redness on UE/LE joints. Skin: Dry and intact. N  Skin ulcers with bleeding from wound sites- both legs  CNS: A, OX3. Moves all extremity. CN grossly ok.     Microbiology  All Micro Results Procedure Component Value Units Date/Time    CULTURE, VIRAL [[id number]] Collected: 08/20/22 1115    Order Status: Completed Specimen: Other from Miscellaneous sample Updated: 08/22/22 1237     Source LEG        Viral Culture, General Comment        Comment: (NOTE)  Preliminary Report:  No virus isolated at 24 hours. Next report to follow after 4 days.   Performed At: [facility] & [address] [id number]  Arnulfo Mathews MD NK:[id number]         CULTURE, BLOOD [[id number]] Collected: 08/19/22 1854    Order Status: Completed Specimen: Blood Updated: 08/22/22 1043     Special Requests: NO SPECIAL REQUESTS        Culture result: NO GROWTH 3 DAYS       CULTURE, BLOOD 2nd DRAW (required for [facility]) [[id number]] Collected: 08/19/22 1900    Order Status: Completed Specimen: Blood Updated: 08/22/22 1043     Special Requests: NO SPECIAL REQUESTS        Culture result: NO GROWTH 3 DAYS       CULTURE, WOUND [place] STAIN [[id number]]  (Abnormal) Collected: 08/19/22 1845    Order Status: Completed Specimen: Wound from Leg Updated: 08/21/22 1447     Special Requests: NO SPECIAL REQUESTS        GRAM STAIN NO WBC'S SEEN         3+ GRAM NEGATIVE RODS               1+ GRAM POSITIVE COCCI IN PAIRS           Culture result:       HEAVY GRAM NEGATIVE RODS OXIDASE POSITIVE                  MODERATE POSSIBLE STAPHYLOCOCCUS AUREUS                  HEAVY  MIXED ENTERIC GRAM NEGATIVE RODS      CULTURE, URINE [[id number]] Collected: 08/19/22 1815    Order Status: Canceled Specimen: Urine from Clean catch               Lab results:    Chemistry  Recent Labs     08/22/22  0145 08/21/22  0801 08/20/22  1250   GLU 77 77 83    139 138   K 4.2 4.6 4.4    115* 111   CO2 21 15* 18*   BUN 10 12 21*   CREA 0.75 0.67 1.09   CA 8.8 8.3* 8.4*   AGAP 7 9 9   BUCR 13 18 19   AP 78 69 76   TP 6.9 6.6 7.0   ALB 2.0* 1.7* 2.0*   GLOB 4.9* 4.9* 5.0*   AGRAT 0.4* 0.3* 0.4*       CBC w/ Diff  Recent Labs     08/22/22  0145 08/20/22  1250 08/19/22  1854   WBC 10.2 12.7 12.4   RBC 3.28* 3.49* 4.16*   HGB 8.7* 8.9* 10.7*   HCT 28.8* 29.7* 34.4*    352 468*   GRANS  --   --  79*   LYMPH  --   --  13*   EOS  --   --  1       XR TIB/FIB LT    Result Date: 8/19/2022  EXAM: 2 views of bilateral tibia fibula CLINICAL INDICATION/HISTORY: Bilateral leg pain COMPARISON: None. _______________ FINDINGS: No fracture or dislocation. Degenerative changes in the knees. Patient is morbidly obese with diffuse soft tissue edema and reticulation involving both lower legs. Diffuse mottling of the soft tissues bilaterally with patchy lucencies identified. _______________     1. Morbid obesity with diffuse soft tissue edema and reticulation involving both lower legs. Mottled appearance of the soft tissues with patchy lucencies bilaterally. Difficult to know if this relates to soft tissue gas, lower extremity wounds, and/or mottled appearance from extensive edema. XR TIB/FIB RT    Result Date: 8/19/2022  EXAM: 2 views of bilateral tibia fibula CLINICAL INDICATION/HISTORY: Bilateral leg pain COMPARISON: None. _______________ FINDINGS: No fracture or dislocation. Degenerative changes in the knees. Patient is morbidly obese with diffuse soft tissue edema and reticulation involving both lower legs. Diffuse mottling of the soft tissues bilaterally with patchy lucencies identified. _______________     1. Morbid obesity with diffuse soft tissue edema and reticulation involving both lower legs. Mottled appearance of the soft tissues with patchy lucencies bilaterally. Difficult to know if this relates to soft tissue gas, lower extremity wounds, and/or mottled appearance from extensive edema. XR CHEST PORT    Result Date: 8/19/2022  EXAM: CHEST RADIOGRAPH CLINICAL INDICATION/HISTORY: sepsis   > Additional: Leg wounds, bilateral leg pain COMPARISON: None TECHNIQUE: Portable frontal view of the chest _______________ FINDINGS: SUPPORT DEVICES: None.  HEART AND MEDIASTINUM: Heart size is normal. LUNGS AND PLEURAL SPACES: No focal consolidation, effusion, or pneumothorax. BONES AND SOFT TISSUES: Unremarkable. _______________     No active cardiopulmonary disease. ECHO ADULT COMPLETE    Result Date: 8/22/2022  Formatting of this result is different from the original.   Left Ventricle: Normal left ventricular systolic function with a visually estimated EF of 55 - 60%. Left ventricle size is normal. Normal wall thickness. Unable to assess wall motion. Normal diastolic function. Right Ventricle: Not well visualized. Technical qualifiers: Echo study was technically difficult due to patient's body habitus.      Procedures/imaging: see electronic medical records for all procedures/Xrays and details which were not copied into this note but were reviewed prior to creation of Plan

## 2022-08-22 NOTE — PROGRESS NOTES
conducted an initial consultation and Spiritual Assessment for Alan Vazquez, who is a 22 y.o.,female. Patients Primary Language is: [place]. According to the patients EMR Taoism Affiliation is: No Christian. The reason the Patient came to the hospital is:   Patient Active Problem List    Diagnosis Date Noted    Super-super obese ([facility] 08/19/2022    Bilateral lower leg cellulitis 08/19/2022    Hyperglycemia 08/19/2022    Hyponatremia 08/19/2022    GUCCI (acute kidney injury) ([facility] 08/19/2022    Severe anxiety 08/19/2022    HTN (hypertension) 08/19/2022    Multiple open wounds of lower leg, initial encounter 08/19/2022        The  provided the following Interventions:  Initiated a relationship of care and support. Explored issues of elsi, belief, spirituality and Nondenominational/ritual needs while hospitalized. Listened empathically. Provided chaplaincy education. Provided information about Spiritual Care Services. Offered prayer and assurance of continued prayers on patients behalf. Chart reviewed. The following outcomes were achieved:  Patient shared limited information about both their medical narrative and spiritual journey/beliefs. Patient processed feeling about current hospitalization. Patient expressed gratitude for pastoral care visit. Assessment:  Patient does not have any Nondenominational/cultural needs that will affect patients preferences in health care. There are no further spiritual or Nondenominational issues which require intervention at this time. Plan:  Chaplains will continue to follow and will provide pastoral care on an as needed/requested basis.  recommends bedside caregivers page  on duty if patient shows signs of acute spiritual or emotional distress.       Sister Shyanne Hancock [place], [address]  [phone]  conducted an initial consultation and Spiritual Assessment for Alan Vazquez, who is a 25 y.o.,female. Patients Primary Language is: [place]. According to the patients EMR Evangelical Affiliation is: No Hindu. The reason the Patient came to the hospital is:   Patient Active Problem List    Diagnosis Date Noted    Super-super obese ([facility] 08/19/2022    Bilateral lower leg cellulitis 08/19/2022    Hyperglycemia 08/19/2022    Hyponatremia 08/19/2022    GUCCI (acute kidney injury) ([facility] 08/19/2022    Severe anxiety 08/19/2022    HTN (hypertension) 08/19/2022    Multiple open wounds of lower leg, initial encounter 08/19/2022        The  provided the following Interventions:  Initiated a relationship of care and support. Explored issues of elsi, belief, spirituality and Caodaism/ritual needs while hospitalized. Listened empathically. Provided chaplaincy education. Provided information about Spiritual Care Services. Offered prayer and assurance of continued prayers on patients behalf. Chart reviewed. The following outcomes were achieved:  Patient shared limited information about both their medical narrative and spiritual journey/beliefs. Patient processed feeling about current hospitalization. Patient expressed gratitude for pastoral care visit. Assessment:  Patient does not have any Caodaism/cultural needs that will affect patients preferences in health care. There are no further spiritual or Caodaism issues which require intervention at this time. Plan:  Chaplains will continue to follow and will provide pastoral care on an as needed/requested basis.  recommends bedside caregivers page  on duty if patient shows signs of acute spiritual or emotional distress. Sister Anuj Serrano, [place], [address]  [phone]  conducted an initial consultation and Spiritual Assessment for Jeannine Ortega, who is a 22 y.o.,female. Patients Primary Language is: [place].    According to the patients EMR Evangelical Affiliation is: No Restorationism. The reason the Patient came to the hospital is:   Patient Active Problem List    Diagnosis Date Noted    Super-super obese ([facility] 08/19/2022    Bilateral lower leg cellulitis 08/19/2022    Hyperglycemia 08/19/2022    Hyponatremia 08/19/2022    GUCCI (acute kidney injury) ([facility] 08/19/2022    Severe anxiety 08/19/2022    HTN (hypertension) 08/19/2022    Multiple open wounds of lower leg, initial encounter 08/19/2022        The  provided the following Interventions:  Initiated a relationship of care and support. Explored issues of elsi, belief, spirituality and Episcopalian/ritual needs while hospitalized. Listened empathically. Provided chaplaincy education. Provided information about Spiritual Care Services. Offered assurance of continued prayers on patients behalf. Chart reviewed. The following outcomes were achieved:  Patient shared limited information about both their medical narrative and spiritual journey/beliefs. Patient processed feeling about current hospitalization. Patient expressed gratitude for pastoral care visit. Assessment:  Patient does not have any Episcopalian/cultural needs that will affect patients preferences in health care. There are no further spiritual or Episcopalian issues which require intervention at this time. Plan:  Chaplains will continue to follow and will provide pastoral care on an as needed/requested basis.  recommends bedside caregivers page  on duty if patient shows signs of acute spiritual or emotional distress.       Sister Michel [place], [place], [address]  [phone]

## 2022-08-22 NOTE — PROGRESS NOTES
Occupational Therapy Screening:  Services are indicated and therapy order is required. An InBasket screening referral was triggered for occupational therapy based on results obtained during the nursing admission assessment. The patients chart was reviewed and the patient is appropriate for a skilled therapy evaluation. Please order a consult for occupational therapy if you are in agreement and would like an evaluation to be completed. Thank you.     Silvana Nguyen, OTR/L

## 2022-08-22 NOTE — PROGRESS NOTES
PT orders received and chart reviewed. Attempted PT eval.  Pt requesting PT to return once B LE have been dressed by wound care. Will return later as pt schedule allows.

## 2022-08-22 NOTE — PROGRESS NOTES
Hospitalist Progress Note    Patient: Maximilian Blackburn MRN: [id number]  CSN: [id number]    YOB: 2000  Age: 25 y.o.   Sex: female    DOA: 8/19/2022 LOS:  LOS: 3 days          Chief Complaint:      Apnea and hypoxemia noted at night   85 pecrcent and started oxygen with improvement    Assessment/Plan   Bilateral lower leg cellulitis with multiple open wounds of her legs-see images in separate progress note  Now on UnasynIV  --Wound care consult today   --Echocardiogram to assess ejection fraction given marked leg swelling was done normal EF but limited due to body size  --Duplex dopplers of the lower extremities to rule out DVT discusssed with nursing clean up and wrapping   ID consult appreciated     --GUCCI-likely due to infection and Nsaid use  As well as diuretic use  Stop fluids has improved  -Avoid nephrotoxins    Low albumin  Start iv albumin with improvement likely adding to edema   Check prealbumin    Hypoemia and apnea at night   Start oxygen at night  Needs out patient sleep test     Severe anxiety-she does not leave her house  -Case management consult for home health wound care     Hypertension  -Lopressor twice daily started     Hyperglycemia-no DM  -Follow-up hemoglobin A1c normal  - Sliding scale insulin stopped     Hyponatremia  Improved was on diuettics        Super morbid obesity-BMI 84  -Bariatric surgery recommended  -Sleep study recommended to evaluate for sleep apnea    Bleeding with drop in hemoglobin  Hold lovenox duplex leg pending  Oxygen at night  Menses not heavy and towards end  Monitor h/h for stablization     Patient Active Problem List   Diagnosis Code    Super-super obese (HCC) E66.01    Bilateral lower leg cellulitis L03.116, L03.115    Hyperglycemia R73.9    Hyponatremia E87.1    GUCCI (acute kidney injury) ([facility] [address] N17.9    Severe anxiety F41.9    HTN (hypertension) I10    Multiple open wounds of lower leg, initial encounter S81.156X       Subjective:    Feels better no pain  Normally uses NSAID otc and otc diuretics   Has fiance monogomous relationship  Wounds worse over 3 months    Apnea and hypxoemia at night  Bleeding now from wounds on theraputic dose lovenox     Review of systems:    Constitutional: denies fevers, chills, myalgias  Respiratory:+SOB, cough  Cardiovascular: denies chest pain, palpitations  Gastrointestinal: denies nausea, vomiting, diarrhea      Vital signs/Intake and Output:  Visit Vitals  /60 (BP 1 Location: Right lower arm, BP Patient Position: At rest)   Pulse 88   Temp 98.1 °F (36.7 °C)   Resp 20   Ht 5' 6\" (1.676 m)   Wt (!) 237.7 kg (524 lb)   SpO2 96%   Breastfeeding No   BMI 84.58 kg/m²     Current Shift:  No intake/output data recorded.   Last three shifts:  08/20 1901 - 08/22 0700  In: 0   Out: 801 [Urine:800]    Exam:    GeneralMorbidly obese  Head/Neck: NCAT, supple, No masses, No lymphadenopathy  CVS:Regular rate and rhythm, no M/R/G, S1/S2 heard, no thrill  Lungs:Clear to auscultation bilaterally, no wheezes, rhonchi, or rales  Abdomen: large panus on menstrual cycle  Extremities: No C/C/E, pulses palpable 2+  Skin:  Draining bilateral leg wounds with foul odor today both legs are bloody wounds not visulaized well discussed with nursing   Clarified not from menses     Neuro:grossly normal , follows commands  Psych:appropriate flat affect                 Labs: Results:       Chemistry Recent Labs     08/22/22  0145 08/21/22  0801 08/20/22  1250   GLU 77 77 83    139 138   K 4.2 4.6 4.4    115* 111   CO2 21 15* 18*   BUN 10 12 21*   CREA 0.75 0.67 1.09   CA 8.8 8.3* 8.4*   AGAP 7 9 9   BUCR 13 18 19   AP 78 69 76   TP 6.9 6.6 7.0   ALB 2.0* 1.7* 2.0*   GLOB 4.9* 4.9* 5.0*   AGRAT 0.4* 0.3* 0.4*        CBC w/Diff Recent Labs     08/22/22  0145 08/20/22  1250 08/19/22  1854   WBC 10.2 12.7 12.4   RBC 3.28* 3.49* 4.16*   HGB 8.7* 8.9* 10.7*   HCT 28.8* 29.7* 34.4*    352 468*   GRANS  --   --  79*   LYMPH  --   --  13*   EOS --   --  1        Cardiac Enzymes Recent Labs     08/19/22  1854           Coagulation No results for input(s): PTP, INR, APTT, INREXT, INREXT in the last 72 hours. Lipid Panel Lab Results   Component Value Date/Time    Cholesterol, total 235 (H) 08/19/2022 06:54 PM    HDL Cholesterol 30 (L) 08/19/2022 06:54 PM    LDL, calculated 168 (H) 08/19/2022 06:54 PM    VLDL, calculated 37 08/19/2022 06:54 PM    Triglyceride 185 (H) 08/19/2022 06:54 PM    CHOL/HDL Ratio 7.8 (H) 08/19/2022 06:54 PM      BNP No results for input(s): BNPP in the last 72 hours.    Liver Enzymes Recent Labs     08/22/22  0145   TP 6.9   ALB 2.0*   AP 78        Thyroid Studies No results found for: T4, T3U, TSH, TSHEXT, TSHEXT     Procedures/imaging: see electronic medical records for all procedures/Xrays and details which were not copied into this note but were reviewed prior to creation of Soto Amos MD

## 2022-08-22 NOTE — PROGRESS NOTES
Chart reviewed, noted reviewed, noted pt is self pay which can be a barrier with discharge process,please consider pricing when for discharge medications, Last resort assistance program can assist but put does have a $150 max per patient. Per Chart Rafael has seen pt, mother applied for medicaid for pt 4 weeks ago. For discharge planning cm will notify [facility] for assistance due to insurance status and arrange [facility] appointment for PCP if patient agrees.

## 2022-08-23 PROBLEM — I89.0 LYMPHEDEMA: Status: ACTIVE | Noted: 2022-08-23

## 2022-08-23 LAB
ALBUMIN SERPL-MCNC: 2.2 G/DL (ref 3.4–5)
ALBUMIN/GLOB SERPL: 0.5 {RATIO} (ref 0.8–1.7)
ALP SERPL-CCNC: 73 U/L (ref 45–117)
ALT SERPL-CCNC: 40 U/L (ref 13–56)
ANION GAP SERPL CALC-SCNC: 7 MMOL/L (ref 3–18)
AST SERPL-CCNC: 50 U/L (ref 10–38)
BILIRUB SERPL-MCNC: 0.4 MG/DL (ref 0.2–1)
BUN SERPL-MCNC: 6 MG/DL (ref 7–18)
BUN/CREAT SERPL: 10 (ref 12–20)
CALCIUM SERPL-MCNC: 8.6 MG/DL (ref 8.5–10.1)
CHLORIDE SERPL-SCNC: 109 MMOL/L (ref 100–111)
CO2 SERPL-SCNC: 22 MMOL/L (ref 21–32)
CREAT SERPL-MCNC: 0.58 MG/DL (ref 0.6–1.3)
ERYTHROCYTE [DISTWIDTH] IN BLOOD BY AUTOMATED COUNT: 16.7 % (ref 11.6–14.5)
GLOBULIN SER CALC-MCNC: 4.8 G/DL (ref 2–4)
GLUCOSE BLD STRIP.AUTO-MCNC: 85 MG/DL (ref 70–110)
GLUCOSE SERPL-MCNC: 86 MG/DL (ref 74–99)
HCT VFR BLD AUTO: 27.4 % (ref 35–45)
HGB BLD-MCNC: 8.3 G/DL (ref 12–16)
MCH RBC QN AUTO: 25.9 PG (ref 24–34)
MCHC RBC AUTO-ENTMCNC: 30.3 G/DL (ref 31–37)
MCV RBC AUTO: 85.4 FL (ref 78–100)
NRBC # BLD: 0.02 K/UL (ref 0–0.01)
NRBC BLD-RTO: 0.2 PER 100 WBC
PLATELET # BLD AUTO: 359 K/UL (ref 135–420)
PMV BLD AUTO: 9.6 FL (ref 9.2–11.8)
POTASSIUM SERPL-SCNC: 4 MMOL/L (ref 3.5–5.5)
PREALB SERPL-MCNC: 10 MG/DL (ref 14–35)
PROT SERPL-MCNC: 7 G/DL (ref 6.4–8.2)
RBC # BLD AUTO: 3.21 M/UL (ref 4.2–5.3)
SODIUM SERPL-SCNC: 138 MMOL/L (ref 136–145)
TSH SERPL DL<=0.05 MIU/L-ACNC: 3.35 UIU/ML (ref 0.36–3.74)
WBC # BLD AUTO: 9.2 K/UL (ref 4.6–13.2)

## 2022-08-23 PROCEDURE — 74011250637 HC RX REV CODE- 250/637: Performed by: INTERNAL MEDICINE

## 2022-08-23 PROCEDURE — 80053 COMPREHEN METABOLIC PANEL: CPT

## 2022-08-23 PROCEDURE — 77010033678 HC OXYGEN DAILY

## 2022-08-23 PROCEDURE — 84443 ASSAY THYROID STIM HORMONE: CPT

## 2022-08-23 PROCEDURE — 74011000250 HC RX REV CODE- 250: Performed by: FAMILY MEDICINE

## 2022-08-23 PROCEDURE — P9047 ALBUMIN (HUMAN), 25%, 50ML: HCPCS | Performed by: INTERNAL MEDICINE

## 2022-08-23 PROCEDURE — 97162 PT EVAL MOD COMPLEX 30 MIN: CPT

## 2022-08-23 PROCEDURE — 65270000029 HC RM PRIVATE

## 2022-08-23 PROCEDURE — 82962 GLUCOSE BLOOD TEST: CPT

## 2022-08-23 PROCEDURE — 85027 COMPLETE CBC AUTOMATED: CPT

## 2022-08-23 PROCEDURE — 74011250636 HC RX REV CODE- 250/636: Performed by: INTERNAL MEDICINE

## 2022-08-23 PROCEDURE — 36415 COLL VENOUS BLD VENIPUNCTURE: CPT

## 2022-08-23 PROCEDURE — 97110 THERAPEUTIC EXERCISES: CPT

## 2022-08-23 PROCEDURE — 2709999900 HC NON-CHARGEABLE SUPPLY

## 2022-08-23 PROCEDURE — 74011250637 HC RX REV CODE- 250/637: Performed by: FAMILY MEDICINE

## 2022-08-23 RX ORDER — DIPHENHYDRAMINE HYDROCHLORIDE 50 MG/ML
12.5 INJECTION, SOLUTION INTRAMUSCULAR; INTRAVENOUS
Status: DISCONTINUED | OUTPATIENT
Start: 2022-08-23 | End: 2022-08-26 | Stop reason: HOSPADM

## 2022-08-23 RX ADMIN — ALBUMIN (HUMAN) 12.5 G: 0.25 INJECTION, SOLUTION INTRAVENOUS at 11:17

## 2022-08-23 RX ADMIN — CALCIUM 500 MG: 500 TABLET ORAL at 11:17

## 2022-08-23 RX ADMIN — METOPROLOL TARTRATE 12.5 MG: 25 TABLET, FILM COATED ORAL at 08:13

## 2022-08-23 RX ADMIN — ENOXAPARIN SODIUM 40 MG: 100 INJECTION SUBCUTANEOUS at 20:43

## 2022-08-23 RX ADMIN — NYSTATIN: 100000 POWDER TOPICAL at 20:44

## 2022-08-23 RX ADMIN — METOPROLOL TARTRATE 12.5 MG: 25 TABLET, FILM COATED ORAL at 20:43

## 2022-08-23 RX ADMIN — OXYCODONE 10 MG: 5 TABLET ORAL at 09:29

## 2022-08-23 RX ADMIN — SODIUM CHLORIDE, PRESERVATIVE FREE 10 ML: 5 INJECTION INTRAVENOUS at 21:57

## 2022-08-23 RX ADMIN — ALBUMIN (HUMAN) 12.5 G: 0.25 INJECTION, SOLUTION INTRAVENOUS at 22:25

## 2022-08-23 RX ADMIN — OXYCODONE 10 MG: 5 TABLET ORAL at 19:17

## 2022-08-23 RX ADMIN — CALCIUM 500 MG: 500 TABLET ORAL at 18:27

## 2022-08-23 RX ADMIN — ALBUMIN (HUMAN) 12.5 G: 0.25 INJECTION, SOLUTION INTRAVENOUS at 18:28

## 2022-08-23 RX ADMIN — NYSTATIN: 100000 POWDER TOPICAL at 11:24

## 2022-08-23 RX ADMIN — AMOXICILLIN AND CLAVULANATE POTASSIUM 1 TABLET: 875; 125 TABLET, FILM COATED ORAL at 20:43

## 2022-08-23 RX ADMIN — CITALOPRAM HYDROBROMIDE 10 MG: 10 TABLET ORAL at 08:13

## 2022-08-23 RX ADMIN — CALCIUM 500 MG: 500 TABLET ORAL at 08:13

## 2022-08-23 RX ADMIN — AMOXICILLIN AND CLAVULANATE POTASSIUM 1 TABLET: 875; 125 TABLET, FILM COATED ORAL at 08:13

## 2022-08-23 RX ADMIN — SODIUM CHLORIDE, PRESERVATIVE FREE 10 ML: 5 INJECTION INTRAVENOUS at 05:50

## 2022-08-23 RX ADMIN — DIPHENHYDRAMINE HYDROCHLORIDE 12.5 MG: 50 INJECTION, SOLUTION INTRAMUSCULAR; INTRAVENOUS at 23:33

## 2022-08-23 RX ADMIN — ALBUMIN (HUMAN) 12.5 G: 0.25 INJECTION, SOLUTION INTRAVENOUS at 05:49

## 2022-08-23 NOTE — WOUND CARE
IP WOUND CONSULT    Ezequiel Burnett  MEDICAL RECORD NUMBER:  [id number]  AGE: 25 y.o. GENDER: female  : 2000  TODAY'S DATE:  2022    GENERAL     [] Follow-up   [x] New Consult    Mamta Abdi is a 25 y.o. female referred by:   [x] Physician  [] Nursing  [] Other:         PAST MEDICAL HISTORY    Past Medical History:   Diagnosis Date    Severe anxiety 2022    Super-super obese ([address]         PAST SURGICAL HISTORY    History reviewed. No pertinent surgical history. FAMILY HISTORY    Family History   Problem Relation Age of Onset    Hypertension Mother     Anxiety disorder Mother        SOCIAL HISTORY         ALLERGIES    No Known Allergies    MEDICATIONS    No current facility-administered medications on file prior to encounter. No current outpatient medications on file prior to encounter. Wt Readings from Last 3 Encounters:   22 (!) 237.7 kg (524 lb)       [email or web address] Vitals  BP (!) 117/59   Pulse 88   Temp 97.9 °F (36.6 °C)   Resp 22   Ht 5' 6\" (1.676 m)   Wt (!) 237.7 kg (524 lb)   SpO2 90%   Breastfeeding No   BMI 84.58 kg/m²       ASSESSMENT     Skin impairment Identification:  Type: venous    Contributing Factors: venous stasis, lymphedema, and obesity    Wound Calf Left 22 (Active)   Wound Image   22 0945   Wound Etiology Venous 22 0945   Dressing Status New drainage noted;Breakthrough drainage noted 22 0945   Cleansed Wound cleanser 2245   Dressing/Treatment Non-adherent;Xeroform; Other (Comment) 22 0945   Dressing Change Due 22 0945   Wound Length (cm) 24 cm 22 0945   Wound Width (cm) 24 cm 22 0945   Wound Depth (cm) 0.3 cm 22 0945   Wound Surface Area (cm^2) 576 cm^2 22 0945   Wound Volume (cm^3) 172.8 cm^3 22 0945   Wound Assessment Bleeding 22 0945   Drainage Amount Large 22 0945   Drainage Description Sanguineous 22 0945   Wound Odor None 08/22/22 0945   Margarette-Wound/Incision Assessment Intact;Fragile 08/22/22 0945   Edges Defined edges 08/22/22 0945   Wound Thickness Description Full thickness 08/22/22 0945   Number of days: 1       Wound Calf Right 08/22/22 (Active)   Wound Image   08/22/22 0945   Wound Etiology Venous 08/22/22 0945   Dressing Status New drainage noted;Breakthrough drainage noted 08/22/22 0945   Cleansed Wound cleanser 08/22/22 0945   Wound Length (cm) 14 cm 08/22/22 0945   Wound Width (cm) 13 cm 08/22/22 0945   Wound Depth (cm) 0.2 cm 08/22/22 0945   Wound Surface Area (cm^2) 182 cm^2 08/22/22 0945   Wound Volume (cm^3) 36.4 cm^3 08/22/22 0945   Wound Assessment Bleeding 08/22/22 0945   Drainage Amount Copious 08/22/22 0945   Drainage Description Sanguineous 08/22/22 0945   Wound Odor None 08/22/22 0945   Margarette-Wound/Incision Assessment Intact;Fragile 08/22/22 0945   Edges Defined edges 08/22/22 0945   Wound Thickness Description Full thickness 08/22/22 0945   Number of days: 1          PLAN     Skin Care & Pressure Relief Recommendations  Minimize layers of linen  Pads under patient to optimize support surface  Turn/reposition approximately every 2 hours  Promote continence; Skin Protective lotion/cream to buttocks and sacrum daily and as needed with incontinence care    Recommendations: once patient has completed vascular testing please cover wounds with ABD Secure with Kerlix, and ACE wrap    Teaching completed with:   [x] Patient           [] Family member       [] Caregiver          [] Nursing  [] Other    Patient/Caregiver Teaching:  Level of patient/caregiver understanding able to:   [x] Indicates understanding       [] Needs reinforcement  [] Unsuccessful      [] Verbal Understanding  [] Demonstrated understanding       [] No evidence of learning  [] Refused teaching         [] N/A       Electronically signed by Kwasi Ravi RN on 8/23/2022 at 3:34 PM

## 2022-08-23 NOTE — PROGRESS NOTES
Problem: General Medical Care Plan  Goal: *Vital signs within specified parameters  8/23/2022 1645 by Sylvain Barkley RN  Outcome: Progressing Towards Goal  8/23/2022 1637 by Sylvain Barkley RN  Outcome: Progressing Towards Goal  8/23/2022 1620 by Sylvain Barkley RN  Outcome: Progressing Towards Goal  Goal: *Labs within defined limits  8/23/2022 1645 by Sylvain Barkley RN  Outcome: Progressing Towards Goal  8/23/2022 1637 by Sylvain Barkley RN  Outcome: Progressing Towards Goal  8/23/2022 1620 by Sylvain Barkley RN  Outcome: Progressing Towards Goal  Goal: *Absence of infection signs and symptoms  8/23/2022 1645 by Sylvain Barkley RN  Outcome: Progressing Towards Goal  8/23/2022 1637 by Sylvain Barkley RN  Outcome: Progressing Towards Goal  8/23/2022 1620 by Sylvain Barkley RN  Outcome: Progressing Towards Goal  Goal: *Optimal pain control at patient's stated goal  8/23/2022 1645 by Sylvain Barkley RN  Outcome: Progressing Towards Goal  8/23/2022 1637 by Sylvain Barkley RN  Outcome: Progressing Towards Goal  8/23/2022 1620 by Sylvain Barkley RN  Outcome: Progressing Towards Goal  Goal: *Skin integrity maintained  8/23/2022 1645 by Sylvain Barkley RN  Outcome: Progressing Towards Goal  8/23/2022 1637 by Sylvain Barkley RN  Outcome: Progressing Towards Goal  8/23/2022 1620 by Sylvain Barkley RN  Outcome: Progressing Towards Goal  Goal: *Fluid volume balance  8/23/2022 1645 by Sylvain Barkley RN  Outcome: Progressing Towards Goal  8/23/2022 1637 by Sylvain Barkley RN  Outcome: Progressing Towards Goal  8/23/2022 1620 by Sylvain Barkley RN  Outcome: Progressing Towards Goal  Goal: *Optimize nutritional status  8/23/2022 1645 by Sylvain Barkley RN  Outcome: Progressing Towards Goal  8/23/2022 1637 by Sylvain Barkley RN  Outcome: Progressing Towards Goal  8/23/2022 1620 by Sylvain Barkley RN  Outcome: Progressing Towards Goal  Goal: *Anxiety reduced or absent  8/23/2022 1645 by Sylvain Barkley RN  Outcome: Progressing Towards Goal  8/23/2022 1637 by Natalia Suárez RN  Outcome: Progressing Towards Goal  8/23/2022 1620 by Natalia Suárez RN  Outcome: Progressing Towards Goal  Goal: *Progressive mobility and function (eg: ADL's)  8/23/2022 1645 by Natalia Suárez RN  Outcome: Progressing Towards Goal  8/23/2022 1637 by Natalia Suárez RN  Outcome: Progressing Towards Goal  8/23/2022 1620 by Natalia Suárez RN  Outcome: Progressing Towards Goal

## 2022-08-23 NOTE — PROGRESS NOTES
Hospitalist Progress Note    Patient: Kenton Tavarez MRN: [id number]  CSN: [id number]    YOB: 2000  Age: 25 y.o. Sex: female    DOA: 8/19/2022 LOS:  LOS: 4 days                Assessment/Plan     Patient Active Problem List   Diagnosis Code    Super-super obese ([facility] E66.01    Bilateral lower leg cellulitis L03.116, L03.115    Hyperglycemia R73.9    Hyponatremia E87.1    GUCCI (acute kidney injury) ([facility] N17.9    Severe anxiety F41.9    HTN (hypertension) I10    Multiple open wounds of lower leg, initial encounter S81.930C    Lymphedema I89.0        Chief complaint :  Cellulitis  25-year-old female with super morbid obesity and severe anxiety who has difficulty leaving her house is admitted for bilateral lower leg cellulitis. Bilateral lower leg cellulitis with multiple open wounds of her legs-  Now on augmentin  Wound care consulted  ID following    Bilateral lymphedema -  Echocardiogram twith normal LVF, EF of 55-60%  Duplex dopplers of the lower extremities negative for DVT      GUCCI-  likely due to infection and Nsaid use  resolved     Low albumin  Start iv albumin with improvement likely adding to edema        apnea at night   Start oxygen at night  Needs out patient sleep test     Severe anxiety-  she does not leave her house  Case management consult for home health wound care     Hypertension  Lopressor twice daily started     Hyperglycemia-  no DM  Follow-up hemoglobin A1c normal  Sliding scale insulin stopped     Hyponatremia  Improved         Super morbid obesity-BMI 84  Bariatric surgery recommended  Sleep study recommended to evaluate for sleep apnea     Bleeding with drop in hemoglobin -  Hold lovenox   Menses not heavy and towards end  Monitor h/h for stablization     Disposition : 1-2 days    Review of systems  General: No fevers or chills. Cardiovascular: No chest pain or pressure. No palpitations. Pulmonary: No shortness of breath. Gastrointestinal: No nausea, vomiting. Physical Exam:  General: Awake, cooperative, no acute distress    HEENT: NC, Atraumatic. PERRLA, anicteric sclerae. Lungs: CTA Bilaterally. No Wheezing/Rhonchi/Rales. Heart:  S1 S2,  No murmur, No Rubs, No Gallops  Abdomen: Soft, Non distended, Non tender. +Bowel sounds,   Extremities: Bilateral lymphedema, open wounds bilateral LE, bilateral legs compression wraps. Psych:   Not anxious or agitated. Neurologic:  No acute neurological deficit. Vital signs/Intake and Output:  Visit Vitals  /62 (BP 1 Location: Right lower arm, BP Patient Position: At rest)   Pulse 98   Temp 97.9 °F (36.6 °C)   Resp 24   Ht 5' 6\" (1.676 m)   Wt (!) 237.7 kg (524 lb)   SpO2 90%   Breastfeeding No   BMI 84.58 kg/m²     Current Shift:  No intake/output data recorded. Last three shifts:  No intake/output data recorded. Labs: Results:       Chemistry Recent Labs     08/23/22  0930 08/22/22  0145 08/21/22  0801   GLU 86 77 77    139 139   K 4.0 4.2 4.6    111 115*   CO2 22 21 15*   BUN 6* 10 12   CREA 0.58* 0.75 0.67   CA 8.6 8.8 8.3*   AGAP 7 7 9   BUCR 10* 13 18   AP 73 78 69   TP 7.0 6.9 6.6   ALB 2.2* 2.0* 1.7*   GLOB 4.8* 4.9* 4.9*   AGRAT 0.5* 0.4* 0.3*      CBC w/Diff Recent Labs     08/23/22  0930 08/22/22  0145   WBC 9.2 10.2   RBC 3.21* 3.28*   HGB 8.3* 8.7*   HCT 27.4* 28.8*    340      Cardiac Enzymes No results for input(s): CPK, CKND1, BLAYNE in the last 72 hours. No lab exists for component: CKRMB, TROIP   Coagulation No results for input(s): PTP, INR, APTT, INREXT, INREXT in the last 72 hours.     Lipid Panel Lab Results   Component Value Date/Time    Cholesterol, total 235 (H) 08/19/2022 06:54 PM    HDL Cholesterol 30 (L) 08/19/2022 06:54 PM    LDL, calculated 168 (H) 08/19/2022 06:54 PM    VLDL, calculated 37 08/19/2022 06:54 PM    Triglyceride 185 (H) 08/19/2022 06:54 PM    CHOL/HDL Ratio 7.8 (H) 08/19/2022 06:54 PM      BNP No results for input(s): BNPP in the last 72 hours.   Liver Enzymes Recent Labs     08/23/22  0930   TP 7.0   ALB 2.2*   AP 73      Thyroid Studies No results found for: T4, T3U, TSH, TSHEXT, TSHEXT     Procedures/imaging: see electronic medical records for all procedures/Xrays and details which were not copied into this note but were reviewed prior to creation of Plan

## 2022-08-23 NOTE — PROGRESS NOTES
Problem: Pressure Injury - Risk of  Goal: *Prevention of pressure injury  Description: Document Bryon Scale and appropriate interventions in the flowsheet.   Outcome: Progressing Towards Goal  Note: Pressure Injury Interventions:       Moisture Interventions: Absorbent underpads, Apply protective barrier, creams and emollients, Check for incontinence Q2 hours and as needed, Contain wound drainage, Internal/External urinary devices, Maintain skin hydration (lotion/cream), Minimize layers    Activity Interventions: Pressure redistribution bed/mattress(bed type), PT/OT evaluation    Mobility Interventions: Float heels, HOB 30 degrees or less, Pressure redistribution bed/mattress (bed type)    Nutrition Interventions: Document food/fluid/supplement intake, Offer support with meals,snacks and hydration    Friction and Shear Interventions: Apply protective barrier, creams and emollients, HOB 30 degrees or less, Lift team/patient mobility team, Transfer aides:transfer board/Agustín lift/ceiling lift, Transferring/repositioning devices

## 2022-08-23 NOTE — PROGRESS NOTES
Bilateral leg wounds assessed. Mepitel in place. BLE wrapped in abd, Kerlix, and ACE bandage. Patient tolerated well without noted or voice complaints. Denies pain.

## 2022-08-23 NOTE — PROGRESS NOTES
[facility]  (A Division of [address] [place] [address])                                                                                                                      Siddhartha Ortiz MD  Office #: - Option # [phone]  Fax #: [phone]     Date of Admission: 8/19/2022Date of Note: 8/23/2022  Reason for Referral: Evaluation and antibiotic management of BL cellulitis      Current Antimicrobials:    Prior Antimicrobials:    Augmentin 8/22 to date   Vanco/Zosyn/Clinda-- 8/19 to 8/20    Unasyn 8/20 to 8/22       Assessment- ID related:  --------------------------------------------------------------------    Doubt cellulitis  B/L leg wounds- extensive skin loss- posterior leg-> with bleeding from ulcers at this time  WCX + for MSSA + GNR  Underlying venous stasis/ lymphedeam  Morbidly obese-- BMI of 84! Recommendation for ID issues I am following:  ---------------------------------------------------------------------------FU BCX: NGSF  --WCX with wound colonizers with MSSA and GNR-- final ID pending    Ok with brief abx oral-> Augmentin 875mg PO BID to complete 2-3 more days. Focus of treatment would be  wound care/skin care  No extended ABX needed    Will check back on Thursday or Friday-- can proceed to DC per primary team plan. Will need wound care. Subjective:  Pt has no complaints. No leg pain. ZAKIA RN- would looks much better, no bleeding  Afebrile , no issue with  current abx  Notes/Labs/imaging -reviewed. HPI:  Flor Sung is a 25 y.o. WHITE/NON- with PMH as listed below--morbidly obese with underlying skin wound for few months. She was getting wound care provided by her sister. She report she started to feel pain few weeks ago and was getting worse, which is the reason she came to ED on 8/19. Denies fever, chills, rigors, shortness of breath or cough. No nausea, vomiting, abdominal pain, diarrhea, rash.  No dysuria. Currently on Unasyn. Wound exam done by wound team-- saw her during that time. Active Hospital Problems    Diagnosis Date Noted    Super-super obese ([facility] [address] 08/19/2022    Bilateral lower leg cellulitis 08/19/2022    Hyperglycemia 08/19/2022    Hyponatremia 08/19/2022    GUCCI (acute kidney injury) ([facility] [address] 08/19/2022    Severe anxiety 08/19/2022    HTN (hypertension) 08/19/2022    Multiple open wounds of lower leg, initial encounter 08/19/2022     Past Medical History:   Diagnosis Date    Severe anxiety 08/19/2022    Super-super obese ([facility] [address]      History reviewed. No pertinent surgical history. Family History   Problem Relation Age of Onset    Hypertension Mother     Anxiety disorder Mother      Social History     Socioeconomic History    Marital status: SINGLE     Spouse name: Not on file    Number of children: Not on file    Years of education: Not on file    Highest education level: Not on file   Occupational History    Not on file   Tobacco Use    Smoking status: Not on file    Smokeless tobacco: Not on file   Substance and Sexual Activity    Alcohol use: Not on file    Drug use: Not on file    Sexual activity: Not on file   Other Topics Concern    Not on file   Social History Narrative    Not on file     Social Determinants of Health     Financial Resource Strain: Not on file   Food Insecurity: Not on file   Transportation Needs: Not on file   Physical Activity: Not on file   Stress: Not on file   Social Connections: Not on file   Intimate Partner Violence: Not on file   Housing Stability: Not on file       Allergies:  Patient has no known allergies.      Medications:  Current Facility-Administered Medications   Medication Dose Route Frequency    amoxicillin-clavulanate (AUGMENTIN) 875-125 mg per tablet 1 Tablet  1 Tablet Oral Q12H    enoxaparin (LOVENOX) injection 40 mg  40 mg SubCUTAneous Q24H    albumin human 25% (BUMINATE) solution 12.5 g  12.5 g IntraVENous Q6H    calcium carbonate (OS-ZHEN) tablet 500 mg [elemental]  500 mg Oral TID WITH MEALS    nystatin (MYCOSTATIN) 100,000 unit/gram powder   Topical BID    morphine injection 2 mg  2 mg IntraVENous Q4H PRN    oxyCODONE IR (ROXICODONE) tablet 10 mg  10 mg Oral Q6H PRN    citalopram (CELEXA) tablet 10 mg  10 mg Oral DAILY    traZODone (DESYREL) tablet 50 mg  50 mg Oral QHS PRN    sodium chloride (NS) flush 5-40 mL  5-40 mL IntraVENous Q8H    sodium chloride (NS) flush 5-40 mL  5-40 mL IntraVENous PRN    acetaminophen (TYLENOL) tablet 650 mg  650 mg Oral Q6H PRN    Or    acetaminophen (TYLENOL) suppository 650 mg  650 mg Rectal Q6H PRN    polyethylene glycol (MIRALAX) packet 17 g  17 g Oral DAILY PRN    ondansetron (ZOFRAN ODT) tablet 4 mg  4 mg Oral Q8H PRN    Or    ondansetron (ZOFRAN) injection 4 mg  4 mg IntraVENous Q6H PRN    hydrALAZINE (APRESOLINE) 20 mg/mL injection 10 mg  10 mg IntraVENous Q6H PRN    glucose chewable tablet 16 g  16 g Oral PRN    glucagon (GLUCAGEN) injection 1 mg  1 mg IntraMUSCular PRN    dextrose 10% infusion 0-250 mL  0-250 mL IntraVENous PRN    [Held by provider] 0.9% sodium chloride infusion  100 mL/hr IntraVENous CONTINUOUS    metoprolol tartrate (LOPRESSOR) tablet 12.5 mg  12.5 mg Oral Q12H        ROS:  Pertinent items are noted in the History of Present Illness. She reports to be ambulating at home         Physical Exam:    Temp (24hrs), Av.4 °F (36.9 °C), Min:97.9 °F (36.6 °C), Max:98.7 °F (37.1 °C)    Visit Vitals  BP (!) 117/59   Pulse 88   Temp 97.9 °F (36.6 °C)   Resp 22   Ht 5' 6\" (1.676 m)   Wt (!) 237.7 kg (524 lb)   SpO2 90%   Breastfeeding No   BMI 84.58 kg/m²          GEN: WD Morbidly Obese, not in resp distress. HEENT: Unicteric. EOMI intact  --no neck swelling  CHEST: Non laboured breathing. ABD: Obese/soft. Non tender. Morbidly obese  SO: Purwick in place  EXT: No apparent swelling or redness on UE/LE joints. Skin: Dry and intact.  N  Skin ulcers with bleeding from wound sites- both legs- wound not seen  CNS: A, OX3. Moves all extremity. CN grossly ok. Microbiology  All Micro Results       Procedure Component Value Units Date/Time    CULTURE, BLOOD [[id number]] Collected: 08/19/22 1854    Order Status: Completed Specimen: Blood Updated: 08/23/22 0813     Special Requests: NO SPECIAL REQUESTS        Culture result: NO GROWTH 4 DAYS       CULTURE, BLOOD 2nd DRAW (required for [facility]) [[id number]] Collected: 08/19/22 1900    Order Status: Completed Specimen: Blood Updated: 08/23/22 0813     Special Requests: NO SPECIAL REQUESTS        Culture result: NO GROWTH 4 DAYS       CULTURE, WOUND [place] Janeen STAIN [[id number]]  (Abnormal)  (Susceptibility) Collected: 08/19/22 1845    Order Status: Completed Specimen: Wound from Leg Updated: 08/23/22 0648     Special Requests: NO SPECIAL REQUESTS        GRAM STAIN NO WBC'S SEEN         3+ GRAM NEGATIVE RODS               1+ GRAM POSITIVE COCCI IN PAIRS           Culture result:       HEAVY GRAM NEGATIVE RODS OXIDASE POSITIVE IDENTIFICATION AND SUSCEPTIBILITY TO FOLLOW                  MODERATE STAPHYLOCOCCUS AUREUS                  HEAVY  MIXED ENTERIC GRAM NEGATIVE RODS      CULTURE, VIRAL [[id number]] Collected: 08/20/22 1115    Order Status: Completed Specimen: Other from Miscellaneous sample Updated: 08/22/22 1237     Source LEG        Viral Culture, General Comment        Comment: (NOTE)  Preliminary Report:  No virus isolated at 24 hours. Next report to follow after 4 days.   Performed At: [facility] & [address] [id number]  Ramon Hernandez MD SD:[id number]         CULTURE, URINE [[id number]] Collected: 08/19/22 1815    Order Status: Canceled Specimen: Urine from Clean catch               Lab results:    Chemistry  Recent Labs     08/23/22  0930 08/22/22  0145 08/21/22  0801   GLU 86 77 77    139 139   K 4.0 4.2 4.6    111 115*   CO2 22 21 15*   BUN 6* 10 12   CREA 0.58* 0.75 0.67   CA 8.6 8.8 8.3*   AGAP 7 7 9   BUCR 10* 13 18   AP 73 78 69   TP 7.0 6.9 6.6   ALB 2.2* 2.0* 1.7*   GLOB 4.8* 4.9* 4.9*   AGRAT 0.5* 0.4* 0.3*       CBC w/ Diff  Recent Labs     08/23/22  0930 08/22/22  0145 08/20/22  1250   WBC 9.2 10.2 12.7   RBC 3.21* 3.28* 3.49*   HGB 8.3* 8.7* 8.9*   HCT 27.4* 28.8* 29.7*    340 352       XR TIB/FIB LT    Result Date: 8/19/2022  EXAM: 2 views of bilateral tibia fibula CLINICAL INDICATION/HISTORY: Bilateral leg pain COMPARISON: None. _______________ FINDINGS: No fracture or dislocation. Degenerative changes in the knees. Patient is morbidly obese with diffuse soft tissue edema and reticulation involving both lower legs. Diffuse mottling of the soft tissues bilaterally with patchy lucencies identified. _______________     1. Morbid obesity with diffuse soft tissue edema and reticulation involving both lower legs. Mottled appearance of the soft tissues with patchy lucencies bilaterally. Difficult to know if this relates to soft tissue gas, lower extremity wounds, and/or mottled appearance from extensive edema. XR TIB/FIB RT    Result Date: 8/19/2022  EXAM: 2 views of bilateral tibia fibula CLINICAL INDICATION/HISTORY: Bilateral leg pain COMPARISON: None. _______________ FINDINGS: No fracture or dislocation. Degenerative changes in the knees. Patient is morbidly obese with diffuse soft tissue edema and reticulation involving both lower legs. Diffuse mottling of the soft tissues bilaterally with patchy lucencies identified. _______________     1. Morbid obesity with diffuse soft tissue edema and reticulation involving both lower legs. Mottled appearance of the soft tissues with patchy lucencies bilaterally. Difficult to know if this relates to soft tissue gas, lower extremity wounds, and/or mottled appearance from extensive edema.     XR CHEST PORT    Result Date: 8/19/2022  EXAM: CHEST RADIOGRAPH CLINICAL INDICATION/HISTORY: sepsis   > Additional: Leg wounds, bilateral leg pain COMPARISON: None TECHNIQUE: Portable frontal view of the chest _______________ FINDINGS: SUPPORT DEVICES: None. HEART AND MEDIASTINUM: Heart size is normal. LUNGS AND PLEURAL SPACES: No focal consolidation, effusion, or pneumothorax. BONES AND SOFT TISSUES: Unremarkable. _______________     No active cardiopulmonary disease. ECHO ADULT COMPLETE    Result Date: 8/22/2022  Formatting of this result is different from the original.   Left Ventricle: Normal left ventricular systolic function with a visually estimated EF of 55 - 60%. Left ventricle size is normal. Normal wall thickness. Unable to assess wall motion. Normal diastolic function. Right Ventricle: Not well visualized. Technical qualifiers: Echo study was technically difficult due to patient's body habitus.      Procedures/imaging: see electronic medical records for all procedures/Xrays and details which were not copied into this note but were reviewed prior to creation of Plan

## 2022-08-23 NOTE — PROGRESS NOTES
Problem: General Medical Care Plan  Goal: *Vital signs within specified parameters  8/23/2022 1637 by Andre Villa RN  Outcome: Progressing Towards Goal  8/23/2022 1620 by Andre Villa RN  Outcome: Progressing Towards Goal  Goal: *Labs within defined limits  8/23/2022 1637 by Andre Villa RN  Outcome: Progressing Towards Goal  8/23/2022 1620 by Andre Villa RN  Outcome: Progressing Towards Goal  Goal: *Absence of infection signs and symptoms  8/23/2022 1637 by Andre Villa RN  Outcome: Progressing Towards Goal  8/23/2022 1620 by Andre Villa RN  Outcome: Progressing Towards Goal  Goal: *Optimal pain control at patient's stated goal  8/23/2022 1637 by Andre Villa RN  Outcome: Progressing Towards Goal  8/23/2022 1620 by Andre Villa RN  Outcome: Progressing Towards Goal  Goal: *Skin integrity maintained  8/23/2022 1637 by Andre Villa RN  Outcome: Progressing Towards Goal  8/23/2022 1620 by Andre Villa RN  Outcome: Progressing Towards Goal  Goal: *Fluid volume balance  8/23/2022 1637 by Andre Villa RN  Outcome: Progressing Towards Goal  8/23/2022 1620 by Andre Villa RN  Outcome: Progressing Towards Goal  Goal: *Optimize nutritional status  8/23/2022 1637 by Andre Villa RN  Outcome: Progressing Towards Goal  8/23/2022 1620 by Andre Villa RN  Outcome: Progressing Towards Goal  Goal: *Anxiety reduced or absent  8/23/2022 1637 by Andre Villa RN  Outcome: Progressing Towards Goal  8/23/2022 1620 by Andre Villa RN  Outcome: Progressing Towards Goal  Goal: *Progressive mobility and function (eg: ADL's)  8/23/2022 1637 by Andre Villa RN  Outcome: Progressing Towards Goal  8/23/2022 1620 by Andre Villa RN  Outcome: Progressing Towards Goal

## 2022-08-24 LAB
ALBUMIN SERPL-MCNC: 2.3 G/DL (ref 3.4–5)
ALBUMIN/GLOB SERPL: 0.6 {RATIO} (ref 0.8–1.7)
ALP SERPL-CCNC: 68 U/L (ref 45–117)
ALT SERPL-CCNC: 45 U/L (ref 13–56)
ANION GAP SERPL CALC-SCNC: 5 MMOL/L (ref 3–18)
AST SERPL-CCNC: 48 U/L (ref 10–38)
BACTERIA SPEC CULT: ABNORMAL
BILIRUB SERPL-MCNC: 0.4 MG/DL (ref 0.2–1)
BUN SERPL-MCNC: 6 MG/DL (ref 7–18)
BUN/CREAT SERPL: 9 (ref 12–20)
CALCIUM SERPL-MCNC: 8.6 MG/DL (ref 8.5–10.1)
CHLORIDE SERPL-SCNC: 111 MMOL/L (ref 100–111)
CO2 SERPL-SCNC: 25 MMOL/L (ref 21–32)
CREAT SERPL-MCNC: 0.7 MG/DL (ref 0.6–1.3)
ERYTHROCYTE [DISTWIDTH] IN BLOOD BY AUTOMATED COUNT: 17.2 % (ref 11.6–14.5)
GLOBULIN SER CALC-MCNC: 4 G/DL (ref 2–4)
GLUCOSE SERPL-MCNC: 87 MG/DL (ref 74–99)
GRAM STN SPEC: ABNORMAL
HCT VFR BLD AUTO: 25.5 % (ref 35–45)
HGB BLD-MCNC: 7.7 G/DL (ref 12–16)
MCH RBC QN AUTO: 26 PG (ref 24–34)
MCHC RBC AUTO-ENTMCNC: 30.2 G/DL (ref 31–37)
MCV RBC AUTO: 86.1 FL (ref 78–100)
NRBC # BLD: 0.04 K/UL (ref 0–0.01)
NRBC BLD-RTO: 0.5 PER 100 WBC
PLATELET # BLD AUTO: 261 K/UL (ref 135–420)
PMV BLD AUTO: 10.5 FL (ref 9.2–11.8)
POTASSIUM SERPL-SCNC: 4 MMOL/L (ref 3.5–5.5)
PROT SERPL-MCNC: 6.3 G/DL (ref 6.4–8.2)
RBC # BLD AUTO: 2.96 M/UL (ref 4.2–5.3)
SERVICE CMNT-IMP: ABNORMAL
SODIUM SERPL-SCNC: 141 MMOL/L (ref 136–145)
WBC # BLD AUTO: 8.2 K/UL (ref 4.6–13.2)

## 2022-08-24 PROCEDURE — 85027 COMPLETE CBC AUTOMATED: CPT

## 2022-08-24 PROCEDURE — 74011250637 HC RX REV CODE- 250/637: Performed by: INTERNAL MEDICINE

## 2022-08-24 PROCEDURE — 97530 THERAPEUTIC ACTIVITIES: CPT

## 2022-08-24 PROCEDURE — 74011000250 HC RX REV CODE- 250: Performed by: FAMILY MEDICINE

## 2022-08-24 PROCEDURE — 65270000029 HC RM PRIVATE

## 2022-08-24 PROCEDURE — P9047 ALBUMIN (HUMAN), 25%, 50ML: HCPCS | Performed by: INTERNAL MEDICINE

## 2022-08-24 PROCEDURE — 97110 THERAPEUTIC EXERCISES: CPT

## 2022-08-24 PROCEDURE — 80053 COMPREHEN METABOLIC PANEL: CPT

## 2022-08-24 PROCEDURE — 74011250636 HC RX REV CODE- 250/636: Performed by: INTERNAL MEDICINE

## 2022-08-24 PROCEDURE — 77010033678 HC OXYGEN DAILY

## 2022-08-24 PROCEDURE — 74011250637 HC RX REV CODE- 250/637: Performed by: FAMILY MEDICINE

## 2022-08-24 PROCEDURE — 29580 STRAPPING UNNA BOOT: CPT

## 2022-08-24 PROCEDURE — 36415 COLL VENOUS BLD VENIPUNCTURE: CPT

## 2022-08-24 RX ADMIN — ALBUMIN (HUMAN) 12.5 G: 0.25 INJECTION, SOLUTION INTRAVENOUS at 17:02

## 2022-08-24 RX ADMIN — NYSTATIN: 100000 POWDER TOPICAL at 22:00

## 2022-08-24 RX ADMIN — CALCIUM 500 MG: 500 TABLET ORAL at 17:02

## 2022-08-24 RX ADMIN — CALCIUM 500 MG: 500 TABLET ORAL at 12:06

## 2022-08-24 RX ADMIN — SODIUM CHLORIDE, PRESERVATIVE FREE 10 ML: 5 INJECTION INTRAVENOUS at 15:55

## 2022-08-24 RX ADMIN — ALBUMIN (HUMAN) 12.5 G: 0.25 INJECTION, SOLUTION INTRAVENOUS at 06:12

## 2022-08-24 RX ADMIN — ENOXAPARIN SODIUM 40 MG: 100 INJECTION SUBCUTANEOUS at 23:00

## 2022-08-24 RX ADMIN — ALBUMIN (HUMAN) 12.5 G: 0.25 INJECTION, SOLUTION INTRAVENOUS at 23:55

## 2022-08-24 RX ADMIN — METOPROLOL TARTRATE 12.5 MG: 25 TABLET, FILM COATED ORAL at 09:53

## 2022-08-24 RX ADMIN — AMOXICILLIN AND CLAVULANATE POTASSIUM 1 TABLET: 875; 125 TABLET, FILM COATED ORAL at 09:53

## 2022-08-24 RX ADMIN — CALCIUM 500 MG: 500 TABLET ORAL at 09:54

## 2022-08-24 RX ADMIN — CITALOPRAM HYDROBROMIDE 10 MG: 10 TABLET ORAL at 09:53

## 2022-08-24 RX ADMIN — NYSTATIN: 100000 POWDER TOPICAL at 09:55

## 2022-08-24 RX ADMIN — OXYCODONE 10 MG: 5 TABLET ORAL at 14:14

## 2022-08-24 RX ADMIN — ALBUMIN (HUMAN) 12.5 G: 0.25 INJECTION, SOLUTION INTRAVENOUS at 12:07

## 2022-08-24 RX ADMIN — AMOXICILLIN AND CLAVULANATE POTASSIUM 1 TABLET: 875; 125 TABLET, FILM COATED ORAL at 21:30

## 2022-08-24 RX ADMIN — SODIUM CHLORIDE, PRESERVATIVE FREE 10 ML: 5 INJECTION INTRAVENOUS at 06:12

## 2022-08-24 NOTE — PROGRESS NOTES
D/c plan: home w/ family assistance. Family to transport. Anticipate d/c home 08/25/22. Pt will follow-up in the wound care clinic. PT is recommending a bariatric RW.  CM to follow-up with pt with information on cost.     Care Management Interventions  PCP Verified by CM:  (Pt has a follow-up appt set up with Hannah Castañeda. )  Mode of Transport at Discharge:  (sister will drive her)  Transition of Care Consult (CM Consult): Discharge Planning  Discharge Durable Medical Equipment: Yes (Bariatric RW)  Physical Therapy Consult: Yes  Occupational Therapy Consult: Yes  Support Systems: Other Family Member(s)  The Plan for Transition of Care is Related to the Following Treatment Goals : Home with follow-up at the [facility] care clinic  The Patient and/or Patient Representative was Provided with a Choice of Provider and Agrees with the Discharge Plan?: Yes  Freedom of Choice List was Provided with Basic Dialogue that Supports the Patient's Individualized Plan of Care/Goals, Treatment Preferences and Shares the Quality Data Associated with the Providers?:  (n/a)  Discharge Location  Patient Expects to be Discharged to[de-identified] Home with family assistance

## 2022-08-24 NOTE — PROGRESS NOTES
Problem: Mobility Impaired (Adult and Pediatric)  Goal: *Acute Goals and Plan of Care (Insert Text)  Description: Physical Therapy Goals   Initiated 8/23/2022 and to be accomplished within 7 day(s)  1. Patient will move from supine <> sit with CGA in prep for out of bed activity and change of position. 2.  Patient will perform sit<> stand with CGA/RW in prep for transfers/ambulation. 3.  Patient will ambulate 10 feet with CGA/RW for improved functional mobility at discharge. 4.  Patient will participate with [facility] for accurate performance post discharge. Outcome: Progressing Towards Goal    physical Therapy TREATMENT    Patient: Jo Palma (25 y.o. female)  Date: 8/24/2022  Diagnosis: Cellulitis and abscess of right leg [L03.115, L02.415]  Cellulitis and abscess of left leg [L03.116, L02.416] Bilateral lower leg cellulitis  Precautions: Fall, Skin   Chart, physical therapy assessment, plan of care and goals were reviewed. ASSESSMENT:  Pt seen with Fabby Crenshaw PTA for second set  of skilled hands. Reports pain LL's 1/10 pre and 2/10 post session. Pt pleasant and cooperative t/o session, with occasional times of anxiety, which pt managed well with verbal encouragement. Pt bariatric mattress deflated, and pt rolled for [place] sheet to be pushed aside (to limit sliding once EOB). Pt supine to sit with min A L hand and  using R HR. Tolerated sitting EOB ~20 min. Tolerated LE strengthening ex well as noted below. C/o pain R ankle from [facility] during AP ex and toward end of time dangling limb. Nurse Kendall Denise notified of same and request for wound care to loosen same in order for pt to tolerate standing tomorrow. Pt returned to supine with min A,  rolls  for sheet and pad application with min A, then left with all needs in reach and meal tray set up. Pt may benefit  from bariatric RW appropriate for pt.   Progression toward goals:  []      Improving appropriately and progressing toward goals  [x] Improving slowly and progressing toward goals  []      Not making progress toward goals and plan of care will be adjusted     PLAN:  Patient continues to benefit from skilled intervention to address the above impairments. Continue treatment per established plan of care. Discharge Recommendations:  Home Physical Therapy vs Skilled Nursing Facility  Further Equipment Recommendations for Discharge:  TBD     SUBJECTIVE:   Patient stated I'm okay I think.     OBJECTIVE DATA SUMMARY:   Critical Behavior:  Neurologic State: Alert  Orientation Level: Oriented X4  Cognition: Appropriate decision making, Appropriate safety awareness, Follows commands  Safety/Judgement: Awareness of environment, Fall prevention  Functional Mobility Training:  Bed Mobility:  Rolling: Stand-by assistance; Other (comment) (with trapeze bar)  Supine to Sit: Minimum assistance; Additional time  Sit to Supine: Minimum assistance; Additional time  Scooting: Contact guard assistance;Minimum assistance (to Assist to stabilize feet)  Balance:  Sitting: Intact  Standing:  (TBA)  Therapeutic Exercises:   Seated EOB: knee flex/ext x 10, AP's x 10, LAQ 5x10 sec hold BLE's  Pain:  Pain Scale 1: Numeric (0 - 10)  Pain Intensity 1: 2  Pain Location 1: Leg  Pain Orientation 1: Right;Left  Pain Description 1: Aching  Pain Intervention(s) 1: Medication (see MAR)  Activity Tolerance:   Fair/good  Please refer to the flowsheet for vital signs taken during this treatment.   After treatment:   [] Patient left in no apparent distress sitting up in chair  [x] Patient left in no apparent distress in bed  [x] Call bell left within reach  [x] Nursing notified-Gisell  [] Caregiver present  [] Bed alarm activated      Jez Benavides PT   Time Calculation: 43 mins

## 2022-08-24 NOTE — PROGRESS NOTES
Problem: Mobility Impaired (Adult and Pediatric)  Goal: *Acute Goals and Plan of Care (Insert Text)  Description: Physical Therapy Goals   Initiated 8/23/2022 and to be accomplished within 7 day(s)  1. Patient will move from supine <> sit with CGA in prep for out of bed activity and change of position. 2.  Patient will perform sit<> stand with CGA/RW in prep for transfers/ambulation. 3.  Patient will ambulate 10 feet with CGA/RW for improved functional mobility at discharge. 4.  Patient will participate with [facility] for accurate performance post discharge. Outcome: Progressing Towards Goal    physical Therapy EVALUATION    Patient: Romana Perez (25 y.o. female)  Date: 8/23/2022 (late entry)  Primary Diagnosis: Cellulitis and abscess of right leg [L03.115, L02.415]  Cellulitis and abscess of left leg [L03.116, L02.416]  Precautions: Pt reports ambulation distance as able at any given time PTA (15-20ft) w/o AD for family assist.  Lives with family in [facility] with 1 step entry, no HR's. States leg wounds began last April, cared for same at home, however, became worse and painful in the last 4 weeks. ASSESSMENT :  Based on the objective data described below, the patient is 26 yo F seen on medical unit, found supine in bariatric bed in [facility]. Pt presents with limitations in ROM/motor performance BLE's r/t body habitus and soft tissue approximation. Bilateral lower legs dressed and ace wrapped; R LE more edematous than L. Patient reports pain 1/10 pre post session. Pt educated in therapeutic ex as noted below and returns demonstration accurately as able. Note IV remains in place. Nurse Jarvis Watson 99 in during session and made aware of above. Pt left with all needs in reach, and fiance present in room. Recommend SNF vs HHPT for follow up physical therapy upon discharge (pending progress) to reach maximal level of independence/safety with functional mobility.      Patient will benefit from skilled intervention to address the above impairments. Patients rehabilitation potential is considered to be Fair  Factors which may influence rehabilitation potential include:   []         None noted  []         Mental ability/status  [x]         Medical condition  [x]         Home/family situation and support systems  []         Safety awareness  []         Pain tolerance/management  []         Other:      PLAN :  Recommendations and Planned Interventions:  [x]           Bed Mobility Training             []    Neuromuscular Re-Education  [x]           Transfer Training                   []    Orthotic/Prosthetic Training  [x]           Gait Training                          []    Modalities  [x]           Therapeutic Exercises          []    Edema Management/Control  [x]           Therapeutic Activities            []    Patient and Family Training/Education  []           Other (comment):    Frequency/Duration: Patient will be followed by physical therapy 1-2 times per day to address goals. Discharge Recommendations: [facility] vs [facility] pending progress  Further Equipment Recommendations for Discharge: Bariatric RW     SUBJECTIVE:   Patient stated Right now feeling good.     OBJECTIVE DATA SUMMARY:     Past Medical History:   Diagnosis Date    Severe anxiety 08/19/2022    Super-super obese ([address]    History reviewed. No pertinent surgical history.   Barriers to Learning/Limitations: yes;  physical  Compensate with: Verbal Cues  Prior Level of Function/Home Situation: as noted above  Home Situation  Home Environment: Private residence  # Steps to Enter: 1  Rails to Enter: No  Wheelchair Ramp: No  One/Two Story Residence: One story  Living Alone: No  Support Systems: Other Family Member(s)  Patient Expects to be Discharged to[de-identified] Unable to determine at this time  Current DME Used/Available at Home: None  Tub or Shower Type:  (and walk in shower)  Critical Behavior:  Neurologic State: Alert  Orientation Level: Oriented X4  Cognition: Follows commands  Safety/Judgement: Awareness of environment; Fall prevention  Psychosocial  Patient Behaviors: Flat affect  Skin Condition/Temp: Dry;Warm;Diaphoretic  Skin Integrity: Wound (add Wound LDA) (Bilat LL's dressed and ace wrapped)  Skin Integumentary  Skin Color:  (red legs)  Skin Condition/Temp: Dry;Warm;Diaphoretic  Skin Integrity: Wound (add Wound LDA) (Bilat LL's dressed and ace wrapped)  Strength:    Strength: Generally decreased, functional  Tone & Sensation:   Tone: Normal  Sensation: Intact  Range Of Motion:  AROM: Generally decreased, functional  PROM: Generally decreased, functional  Functional Mobility:  Bed Mobility:  Rolling: Stand-by assistance  Therapeutic Exercises:   Educated in GS/QS, and AP's with pt return demonstration. Pain:  Pain Scale 1: Numeric (0 - 10)  Pain Intensity 1: 1/10  Pain Location 1: Leg  Pain Orientation 1: Left;Right  Pain Intervention(s) 1: Medication (see MAR)  Activity Tolerance:   Fair   Please refer to the flowsheet for vital signs taken during this treatment. After treatment:   []         Patient left in no apparent distress sitting up in chair  [x]         Patient left in no apparent distress in bed  [x]         Call bell left within reach  [x]         Nursing notified  [x]         Fiance present-Az  []         Bed alarm activated  []         SCDs applied    COMMUNICATION/EDUCATION:   [x]         Role of Physical Therapy in the acute care setting. [x]         Fall prevention education was provided and the patient/caregiver indicated understanding. [x]         Patient/family have participated as able in goal setting and plan of care. [x]         Patient/family agree to work toward stated goals and plan of care. []         Patient understands intent and goals of therapy, but is neutral about his/her participation. []         Patient is unable to participate in goal setting/plan of care: ongoing with therapy staff.   [] Other:    Thank you for this referral.  Mary Billy, PT   Time Calculation: 24 mins      Eval Complexity: History: MEDIUM  Complexity : 1-2 comorbidities / personal factors will impact the outcome/ POC Exam:HIGH Complexity : 4+ Standardized tests and measures addressing body structure, function, activity limitation and / or participation in recreation  Presentation: MEDIUM Complexity : Evolving with changing characteristics  Clinical Decision Making:Medium Complexity    Overall Complexity:MEDIUM

## 2022-08-24 NOTE — PROGRESS NOTES
Problem: Pressure Injury - Risk of  Goal: *Prevention of pressure injury  Description: Document Bryon Scale and appropriate interventions in the flowsheet.   Outcome: Progressing Towards Goal  Note: Pressure Injury Interventions:       Moisture Interventions: Absorbent underpads, Apply protective barrier, creams and emollients, Check for incontinence Q2 hours and as needed, Contain wound drainage, Internal/External urinary devices, Maintain skin hydration (lotion/cream), Minimize layers    Activity Interventions: Pressure redistribution bed/mattress(bed type), PT/OT evaluation    Mobility Interventions: Float heels, HOB 30 degrees or less, Pressure redistribution bed/mattress (bed type)    Nutrition Interventions: Document food/fluid/supplement intake, Offer support with meals,snacks and hydration    Friction and Shear Interventions: Apply protective barrier, creams and emollients, HOB 30 degrees or less, Lift team/patient mobility team, Transfer aides:transfer board/Agustín lift/ceiling lift, Transferring/repositioning devices                Problem: Patient Education: Go to Patient Education Activity  Goal: Patient/Family Education  Outcome: Progressing Towards Goal

## 2022-08-24 NOTE — PROGRESS NOTES
Hospitalist Progress Note    Patient: Romana Perez MRN: [id number]  CSN: [id number]    YOB: 2000  Age: 25 y.o. Sex: female    DOA: 8/19/2022 LOS:  LOS: 5 days                Assessment/Plan     Patient Active Problem List   Diagnosis Code    Super-super obese ([facility] E66.01    Bilateral lower leg cellulitis L03.116, L03.115    Hyperglycemia R73.9    Hyponatremia E87.1    GUCCI (acute kidney injury) ([facility] N17.9    Severe anxiety F41.9    HTN (hypertension) I10    Multiple open wounds of lower leg, initial encounter S81.809A    Lymphedema I89.0        Chief complaint :  Cellulitis  81-year-old female with super morbid obesity and severe anxiety who has difficulty leaving her house is admitted for bilateral lower leg cellulitis. Bilateral lower leg cellulitis with multiple open wounds of her legs-  Now on augmentin  Wound care consulted  ID following    Bilateral lymphedema -  Echocardiogram twith normal LVF, EF of 55-60%  Duplex dopplers of the lower extremities negative for DVT      GUCCI-  likely due to infection and Nsaid use  resolved     Low albumin  Start iv albumin with improvement likely adding to edema        apnea at night   Start oxygen at night  Needs out patient sleep test     Severe anxiety-  she does not leave her house  Case management consult for home health wound care     Hypertension  Lopressor twice daily started     Hyperglycemia-  no DM  Follow-up hemoglobin A1c normal  Sliding scale insulin stopped     Hyponatremia  Improved         Super morbid obesity-BMI 84  Bariatric surgery recommended  Sleep study recommended to evaluate for sleep apnea     Bleeding with drop in hemoglobin -  Hold lovenox   Menses not heavy and towards end  Monitor h/h for stablization     Disposition : 1-2 days    Review of systems  General: No fevers or chills. Cardiovascular: No chest pain or pressure. No palpitations. Pulmonary: No shortness of breath. Gastrointestinal: No nausea, vomiting. Physical Exam:  General: Awake, cooperative, no acute distress    HEENT: NC, Atraumatic. PERRLA, anicteric sclerae. Lungs: CTA Bilaterally. No Wheezing/Rhonchi/Rales. Heart:  S1 S2,  No murmur, No Rubs, No Gallops  Abdomen: Soft, Non distended, Non tender. +Bowel sounds,   Extremities: Bilateral lymphedema, open wounds bilateral LE, bilateral legs compression wraps. Psych:   Not anxious or agitated. Neurologic:  No acute neurological deficit. Vital signs/Intake and Output:  Visit Vitals  /68 (BP 1 Location: Right lower arm, BP Patient Position: At rest;Lying)   Pulse 89   Temp 98.3 °F (36.8 °C)   Resp 18   Ht 5' 6\" (1.676 m)   Wt (!) 255.7 kg (563 lb 11.5 oz)   SpO2 100%   Breastfeeding No   BMI 90.99 kg/m²     Current Shift:  No intake/output data recorded. Last three shifts:  08/23 0701 - 08/24 1900  In: 1140 [P.O.:1140]  Out: 1000 [Urine:1000]            Labs: Results:       Chemistry Recent Labs     08/24/22  0406 08/23/22  0930 08/22/22  0145   GLU 87 86 77    138 139   K 4.0 4.0 4.2    109 111   CO2 25 22 21   BUN 6* 6* 10   CREA 0.70 0.58* 0.75   CA 8.6 8.6 8.8   AGAP 5 7 7   BUCR 9* 10* 13   AP 68 73 78   TP 6.3* 7.0 6.9   ALB 2.3* 2.2* 2.0*   GLOB 4.0 4.8* 4.9*   AGRAT 0.6* 0.5* 0.4*      CBC w/Diff Recent Labs     08/24/22  0406 08/23/22  0930 08/22/22  0145   WBC 8.2 9.2 10.2   RBC 2.96* 3.21* 3.28*   HGB 7.7* 8.3* 8.7*   HCT 25.5* 27.4* 28.8*    359 340      Cardiac Enzymes No results for input(s): CPK, CKND1, BLAYNE in the last 72 hours. No lab exists for component: CKRMB, TROIP   Coagulation No results for input(s): PTP, INR, APTT, INREXT, INREXT in the last 72 hours.     Lipid Panel Lab Results   Component Value Date/Time    Cholesterol, total 235 (H) 08/19/2022 06:54 PM    HDL Cholesterol 30 (L) 08/19/2022 06:54 PM    LDL, calculated 168 (H) 08/19/2022 06:54 PM    VLDL, calculated 37 08/19/2022 06:54 PM    Triglyceride 185 (H) 08/19/2022 06:54 PM    CHOL/HDL Ratio 7.8 (H) 08/19/2022 06:54 PM      BNP No results for input(s): BNPP in the last 72 hours.    Liver Enzymes Recent Labs     08/24/22  0406   TP 6.3*   ALB 2.3*   AP 68      Thyroid Studies Lab Results   Component Value Date/Time    TSH 3.35 08/23/2022 09:30 AM        Procedures/imaging: see electronic medical records for all procedures/Xrays and details which were not copied into this note but were reviewed prior to creation of Plan

## 2022-08-24 NOTE — PROGRESS NOTES
Nutrition Education    Educated on Tips to Support Dionte Weber, Setting Goals for weight management, 1800 calorie 5 days menu  Learners: Patient  Readiness: Acceptance  Method: Explanation and Handout  Response: Verbalizes Understanding  Contact name and number provided. Spoke with pt regarding weight management. Pt states lives with sister and her children. Pt reports eating at separate meal times from sister. States eats out daily, binges eats at night, drinks soda at least 1x daily and juice, eats chips. Does like to eat chicken and fish.  is able to walk around house. Was able to set goals together (limiting eating out once every 2 weeks, discontinue soda and juice, walking at least 15-30 minutes daily). Encouraged pt to work with sister to cook and meal prep healthy meals to avoid ordering out- stated would speak to sister. Encouraged pt to walk as often as possible and do chair exercises. Would recommend outpatient for further weight management/loss.      Farhana Chase, RD

## 2022-08-24 NOTE — WOUND CARE
IP WOUND CONSULT    Magdaleno Reagan  MEDICAL RECORD NUMBER:  [id number]  AGE: 25 y.o. GENDER: female  : 2000  TODAY'S DATE:  2022    GENERAL     [x] Follow-up   [] New Consult    Mamta Neal is a 25 y.o. female referred by:   [] Physician  [x] Nursing  [] Other:         PAST MEDICAL HISTORY    Past Medical History:   Diagnosis Date    Severe anxiety 2022    Super-super obese ([address]         PAST SURGICAL HISTORY    History reviewed. No pertinent surgical history. FAMILY HISTORY    Family History   Problem Relation Age of Onset    Hypertension Mother     Anxiety disorder Mother        SOCIAL HISTORY         ALLERGIES    No Known Allergies    MEDICATIONS    No current facility-administered medications on file prior to encounter. No current outpatient medications on file prior to encounter.        Wt Readings from Last 3 Encounters:   22 (!) 255.7 kg (563 lb 11.5 oz)       [email or web address] Vitals  /68 (BP 1 Location: Right lower arm, BP Patient Position: At rest;Lying)   Pulse 89   Temp 98.3 °F (36.8 °C)   Resp 18   Ht 5' 6\" (1.676 m)   Wt (!) 255.7 kg (563 lb 11.5 oz)   SpO2 100%   Breastfeeding No   BMI 90.99 kg/m²       ASSESSMENT     Skin impairment Identification:  Type: venous    Contributing Factors: venous stasis, lymphedema, chronic pressure, decreased mobility, and obesity    Wound Calf Left 22 (Active)   Wound Image   22 0945   Wound Etiology Venous 22 1519   Dressing Status Breakthrough drainage noted;New drainage noted 22 1519   Cleansed Wound cleanser 22 1519   Dressing/Treatment Hydrofera Blue;Roll gauze 22 1519   Dressing Change Due 22 0945   Wound Length (cm) 13 cm 22 1519   Wound Width (cm) 13 cm 22 1519   Wound Depth (cm) 0.2 cm 22 1519   Wound Surface Area (cm^2) 169 cm^2 22 1519   Change in Wound Size % 70.66 22 1519   Wound Volume (cm^3) 33.8 cm^3 22 1519 Wound Healing % 80 08/24/22 1519   Wound Assessment Granulation tissue 08/24/22 1519   Drainage Amount Moderate 08/24/22 1519   Drainage Description Serosanguinous 08/24/22 1519   Wound Odor None 08/24/22 1519   Margarette-Wound/Incision Assessment Intact 08/24/22 1519   Edges Defined edges 08/24/22 1519   Wound Thickness Description Full thickness 08/24/22 1519   Number of days: 2       Wound Calf Right 08/22/22 (Active)   Wound Image   08/24/22 1519   Wound Etiology Venous 08/24/22 1519   Dressing Status New drainage noted 08/23/22 0813   Cleansed Wound cleanser 08/24/22 1519   Dressing/Treatment Hydrofera Blue;Roll gauze 08/24/22 1519   Wound Length (cm) 24 cm 08/24/22 1519   Wound Width (cm) 24 cm 08/24/22 1519   Wound Depth (cm) 0.2 cm 08/24/22 1519   Wound Surface Area (cm^2) 576 cm^2 08/24/22 1519   Change in Wound Size % -216.48 08/24/22 1519   Wound Volume (cm^3) 115.2 cm^3 08/24/22 1519   Wound Healing % -216 08/24/22 1519   Wound Assessment Granulation tissue 08/24/22 1519   Drainage Amount Large 08/24/22 1519   Drainage Description Serosanguinous 08/24/22 1519   Wound Odor None 08/24/22 1519   Margarette-Wound/Incision Assessment Intact 08/24/22 1519   Edges Defined edges 08/24/22 1519   Wound Thickness Description Full thickness 08/24/22 1519   Number of days: 2      Unna Boot Application   Below Knee    NAME:  Jeannine Otrega  YOB: 2000  MEDICAL RECORD NUMBER:  [id number]  DATE:  8/19/2022    Remove old Unna Boot or Boots. Applied moisturizing agent to dry skin as needed. Appied primary and secondary dressing as ordered. Applied Unna roll from toes to knee overlapping each time. Applied ace wrap or coban from toes to below the knee. Secured with tape and/or metal clips covered with tape. Instructed patient/caregiver to keep dressing dry and intact. DO NOT REMOVE DRESSING.    Instructed pt/family/caregiver to report excessive draining, loose bandage, wet dressing, severe pain or tingling in toes. Applied [employer] dressing below the knee to bilateral lower legs. Unna Boot(s) were applied per  Guidelines.     Response to treatment: Patient tolerated treatment with moderate discomfort but relieved after procedure was completed        PLAN     Skin Care & Pressure Relief Recommendations  Minimize layers of linen  Pads under patient to optimize support surface  Turn/reposition approximately every 2 hours    Recommendations: leave legs wrapped have patient follow up in the wound care clinic at discharge    Teaching completed with:   [x] Patient           [] Family member       [] Caregiver          [x] Nursing  [] Other    Patient/Caregiver Teaching:  Level of patient/caregiver understanding able to:   [x] Indicates understanding       [] Needs reinforcement  [] Unsuccessful      [] Verbal Understanding  [] Demonstrated understanding       [] No evidence of learning  [] Refused teaching         [] N/A       Electronically signed by Brando Lemus RN on 8/24/2022 at 4:10 PM

## 2022-08-25 LAB
BACTERIA SPEC CULT: NORMAL
BACTERIA SPEC CULT: NORMAL
SERVICE CMNT-IMP: NORMAL
SERVICE CMNT-IMP: NORMAL

## 2022-08-25 PROCEDURE — 74011250636 HC RX REV CODE- 250/636: Performed by: INTERNAL MEDICINE

## 2022-08-25 PROCEDURE — 74011000250 HC RX REV CODE- 250: Performed by: FAMILY MEDICINE

## 2022-08-25 PROCEDURE — 74011250637 HC RX REV CODE- 250/637: Performed by: FAMILY MEDICINE

## 2022-08-25 PROCEDURE — P9047 ALBUMIN (HUMAN), 25%, 50ML: HCPCS | Performed by: INTERNAL MEDICINE

## 2022-08-25 PROCEDURE — 97530 THERAPEUTIC ACTIVITIES: CPT

## 2022-08-25 PROCEDURE — 65270000029 HC RM PRIVATE

## 2022-08-25 PROCEDURE — 74011250637 HC RX REV CODE- 250/637: Performed by: INTERNAL MEDICINE

## 2022-08-25 RX ADMIN — CITALOPRAM HYDROBROMIDE 10 MG: 10 TABLET ORAL at 09:11

## 2022-08-25 RX ADMIN — SODIUM CHLORIDE, PRESERVATIVE FREE 10 ML: 5 INJECTION INTRAVENOUS at 00:50

## 2022-08-25 RX ADMIN — NYSTATIN: 100000 POWDER TOPICAL at 23:05

## 2022-08-25 RX ADMIN — METOPROLOL TARTRATE 12.5 MG: 25 TABLET, FILM COATED ORAL at 23:04

## 2022-08-25 RX ADMIN — ALBUMIN (HUMAN) 12.5 G: 0.25 INJECTION, SOLUTION INTRAVENOUS at 17:39

## 2022-08-25 RX ADMIN — NYSTATIN: 100000 POWDER TOPICAL at 09:00

## 2022-08-25 RX ADMIN — SODIUM CHLORIDE, PRESERVATIVE FREE 10 ML: 5 INJECTION INTRAVENOUS at 23:05

## 2022-08-25 RX ADMIN — SODIUM CHLORIDE, PRESERVATIVE FREE 10 ML: 5 INJECTION INTRAVENOUS at 14:00

## 2022-08-25 RX ADMIN — CALCIUM 500 MG: 500 TABLET ORAL at 09:09

## 2022-08-25 RX ADMIN — SODIUM CHLORIDE, PRESERVATIVE FREE 10 ML: 5 INJECTION INTRAVENOUS at 06:02

## 2022-08-25 RX ADMIN — CALCIUM 500 MG: 500 TABLET ORAL at 12:08

## 2022-08-25 RX ADMIN — ALBUMIN (HUMAN) 12.5 G: 0.25 INJECTION, SOLUTION INTRAVENOUS at 12:08

## 2022-08-25 RX ADMIN — AMOXICILLIN AND CLAVULANATE POTASSIUM 1 TABLET: 875; 125 TABLET, FILM COATED ORAL at 09:10

## 2022-08-25 RX ADMIN — METOPROLOL TARTRATE 12.5 MG: 25 TABLET, FILM COATED ORAL at 09:10

## 2022-08-25 RX ADMIN — ENOXAPARIN SODIUM 40 MG: 100 INJECTION SUBCUTANEOUS at 22:30

## 2022-08-25 RX ADMIN — ALBUMIN (HUMAN) 12.5 G: 0.25 INJECTION, SOLUTION INTRAVENOUS at 23:03

## 2022-08-25 RX ADMIN — CALCIUM 500 MG: 500 TABLET ORAL at 17:40

## 2022-08-25 RX ADMIN — OXYCODONE 10 MG: 5 TABLET ORAL at 23:22

## 2022-08-25 RX ADMIN — ALBUMIN (HUMAN) 12.5 G: 0.25 INJECTION, SOLUTION INTRAVENOUS at 05:57

## 2022-08-25 NOTE — PROGRESS NOTES
1243: Pt anxious about participating in OPT with me and is requesting LUIS Richards. Will follow up again for a co-treatment.

## 2022-08-25 NOTE — PROGRESS NOTES
Bedside shift change report given to [address] [place] [address] Iza (oncoming nurse) by Ashley Mason RN   (offgoing nurse). Report included the following information SBAR, Kardex, Intake/Output, MAR, and Recent Results.

## 2022-08-25 NOTE — PROGRESS NOTES
Problem: Pressure Injury - Risk of  Goal: *Prevention of pressure injury  Description: Document Bryon Scale and appropriate interventions in the flowsheet. Outcome: Progressing Towards Goal  Note: Pressure Injury Interventions:       Moisture Interventions: Absorbent underpads    Activity Interventions: Pressure redistribution bed/mattress(bed type)    Mobility Interventions: Pressure redistribution bed/mattress (bed type)    Nutrition Interventions: Document food/fluid/supplement intake    Friction and Shear Interventions: HOB 30 degrees or less                Problem: Patient Education: Go to Patient Education Activity  Goal: Patient/Family Education  Outcome: Progressing Towards Goal     Problem: Falls - Risk of  Goal: *Absence of Falls  Description: Document Nathaniel Fall Risk and appropriate interventions in the flowsheet.   Outcome: Progressing Towards Goal  Note: Fall Risk Interventions:  Mobility Interventions: PT Consult for assist device competence         Medication Interventions: Evaluate medications/consider consulting pharmacy    Elimination Interventions: Call light in reach    History of Falls Interventions: Room close to nurse's station         Problem: General Medical Care Plan  Goal: *Optimal pain control at patient's stated goal  Outcome: Progressing Towards Goal     Problem: General Medical Care Plan  Goal: *Skin integrity maintained  Outcome: Progressing Towards Goal

## 2022-08-25 NOTE — PROGRESS NOTES
D/c plan: Home w/ [address] [facility]. CM met w/ pt at bedside. Delivered bariatric RW to bedside. Discussed home w/ [address] [facility]. Pt states mother applied for Medicaid for her. Advised [facility] and that they don't bill for 30 days, so they will bill her Medicaid if it is approved at that time. Pt verbalizes an understanding. CM did receive approval from Director of  for stretcher transport home if the pt's sister is unable to transport her home tomorrow. CM to follow up.      Care Management Interventions  PCP Verified by CM:  (Pt has a follow-up appt set up with Grace Michael. )  Mode of Transport at Discharge:  (Sister vs BLS)  Transition of Care Consult (CM Consult): [address]: Yes  Discharge Durable Medical Equipment: Yes (Bariatric RW)  Physical Therapy Consult: Yes  Occupational Therapy Consult: Yes  Support Systems: (P) Spouse/Significant Other, Other Family Member(s), Parent(s)  The Plan for Transition of Care is Related to the Following Treatment Goals : Home w/ [address] [facility] and f/u at [facility] wound care clinic  The Patient and/or Patient Representative was Provided with a Choice of Provider and Agrees with the Discharge Plan?: Yes  Freedom of Choice List was Provided with Basic Dialogue that Supports the Patient's Individualized Plan of Care/Goals, Treatment Preferences and Shares the Quality Data Associated with the Providers?: Yes ([facility] )  Discharge Location  Patient Expects to be Discharged to[de-identified] Home with home health

## 2022-08-25 NOTE — PROGRESS NOTES
Hospitalist Progress Note    Patient: Joana Quesada MRN: [id number]  CSN: [id number]    YOB: 2000  Age: 25 y.o. Sex: female    DOA: 8/19/2022 LOS:  LOS: 6 days          Assessment/Plan     Patient Active Problem List   Diagnosis Code    Super-super obese ([facility] E66.01    Bilateral lower leg cellulitis L03.116, L03.115    Hyperglycemia R73.9    Hyponatremia E87.1    GUCCI (acute kidney injury) ([facility] N17.9    Severe anxiety F41.9    HTN (hypertension) I10    Multiple open wounds of lower leg, initial encounter S81.411Y    Lymphedema I89.0        Chief complaint :  Cellulitis  58-year-old female with super morbid obesity and severe anxiety who has difficulty leaving her house is admitted for bilateral lower leg cellulitis.     Bilateral lower leg cellulitis with multiple open wounds of her legs-  Wound care following  ID following -no further systemic abx, focus only on local wound care     Bilateral lymphedema -  Echocardiogram twith normal LVF, EF of 55-60%  Duplex dopplers of the lower extremities negative for DVT      GUCCI-  likely due to infection and Nsaid use  resolved     Low albumin  Start iv albumin with improvement likely adding to edema     apnea at night   Start oxygen at night  Needs out patient sleep test     Severe anxiety-  she does not leave her house  Case management consult for home health wound care     Hypertension  Lopressor twice daily started     Hyperglycemia-  no DM  Follow-up hemoglobin A1c normal  Sliding scale insulin stopped     Hyponatremia resolved      Super morbid obesity-BMI 84  Bariatric surgery recommended  Sleep study recommended to evaluate for sleep apnea     Bleeding with drop in hemoglobin -  Hold lovenox   Menses not heavy and towards end  Monitor h/h for stablization     Disposition : 1 days    S: Feeling sleepy today  Has not eaten breakfast   No new issues  Feels like she will be ready to go home tomorrow   Has help at home     Review of systems  General: No fevers or chills. Cardiovascular: No chest pain or pressure. No palpitations. Pulmonary: No shortness of breath. Gastrointestinal: No nausea, vomiting. Physical Exam:  General: Awake, cooperative, no acute distress    HEENT: NC, Atraumatic. PERRLA, anicteric sclerae. Lungs: CTA Bilaterally. No Wheezing/Rhonchi/Rales. Heart:  S1 S2,  No murmur, No Rubs, No Gallops  Abdomen: Soft, Non distended, Non tender. +Bowel sounds,   Extremities: Bilateral lymphedema, open wounds bilateral LE, bilateral legs compression wraps. Psych:   Not anxious or agitated. Neurologic:  No acute neurological deficit. Vital signs/Intake and Output:  Visit Vitals  /63   Pulse 99   Temp 98.3 °F (36.8 °C)   Resp 17   Ht 5' 6\" (1.676 m)   Wt (!) 257 kg (566 lb 9.3 oz)   SpO2 94%   Breastfeeding No   BMI 91.45 kg/m²     Current Shift:  No intake/output data recorded. Last three shifts:  08/23 1901 - 08/25 0700  In: 1140 [P.O.:1140]  Out: 2200 [Urine:2200]            Labs: Results:       Chemistry Recent Labs     08/24/22  0406 08/23/22  0930   GLU 87 86    138   K 4.0 4.0    109   CO2 25 22   BUN 6* 6*   CREA 0.70 0.58*   CA 8.6 8.6   AGAP 5 7   BUCR 9* 10*   AP 68 73   TP 6.3* 7.0   ALB 2.3* 2.2*   GLOB 4.0 4.8*   AGRAT 0.6* 0.5*        CBC w/Diff Recent Labs     08/24/22  0406 08/23/22  0930   WBC 8.2 9.2   RBC 2.96* 3.21*   HGB 7.7* 8.3*   HCT 25.5* 27.4*    359        Cardiac Enzymes No results for input(s): CPK, CKND1, BLAYNE in the last 72 hours. No lab exists for component: CKRMB, TROIP   Coagulation No results for input(s): PTP, INR, APTT, INREXT, INREXT in the last 72 hours.     Lipid Panel Lab Results   Component Value Date/Time    Cholesterol, total 235 (H) 08/19/2022 06:54 PM    HDL Cholesterol 30 (L) 08/19/2022 06:54 PM    LDL, calculated 168 (H) 08/19/2022 06:54 PM    VLDL, calculated 37 08/19/2022 06:54 PM    Triglyceride 185 (H) 08/19/2022 06:54 PM    CHOL/HDL Ratio 7.8 (H) 08/19/2022 06:54 PM      BNP No results for input(s): BNPP in the last 72 hours.    Liver Enzymes Recent Labs     08/24/22  0406   TP 6.3*   ALB 2.3*   AP 68        Thyroid Studies Lab Results   Component Value Date/Time    TSH 3.35 08/23/2022 09:30 AM        Procedures/imaging: see electronic medical records for all procedures/Xrays and details which were not copied into this note but were reviewed prior to creation of Plan

## 2022-08-25 NOTE — PROGRESS NOTES
[facility]  (A Division of [address] [place] [address])                                                                                                                      Georgia Mcdermott MD  Office #: - Option # [phone]  Fax #: [phone]     Date of Admission: 8/19/2022Date of Note: 8/25/2022  Reason for Referral: Evaluation and antibiotic management of BL cellulitis      Current Antimicrobials:    Prior Antimicrobials:    Augmentin 8/22 to date   Vanco/Zosyn/Clinda-- 8/19 to 8/20    Unasyn 8/20 to 8/22       Assessment- ID related:  --------------------------------------------------------------------    Doubt cellulitis  MDRO colonized wound  B/L leg wounds- extensive skin loss- posterior leg-> with bleeding from ulcers at this time  WCX + for MSSA + GNR  Underlying venous stasis/ lymphedeam  Morbidly obese-- BMI of 84! Recommendation for ID issues I am following:  ----------------------------------------------------------------------  DW wound RN and MD    No further systemic abx indicated    Focus will be on local wound care and skin healing. Wound care per team    ID available as needed     Subjective:  Pt is asleep. Wakes up easily, voices no complaints  Afebrile  Notes/Lab/Micro-- reviewed. : MSSA and Myroides spp--MDRO except Levofloxacin      HPI:  Zainab Maza is a 25 y.o. WHITE/NON- with PMH as listed below--morbidly obese with underlying skin wound for few months. She was getting wound care provided by her sister. She report she started to feel pain few weeks ago and was getting worse, which is the reason she came to ED on 8/19. Denies fever, chills, rigors, shortness of breath or cough. No nausea, vomiting, abdominal pain, diarrhea, rash. No dysuria. Currently on Unasyn. Wound exam done by wound team-- saw her during that time.            Active Hospital Problems    Diagnosis Date Noted    Lymphedema 08/23/2022    Super-super obese ([facility] 08/19/2022    Bilateral lower leg cellulitis 08/19/2022    Hyperglycemia 08/19/2022    Hyponatremia 08/19/2022    GUCCI (acute kidney injury) ([facility] 08/19/2022    Severe anxiety 08/19/2022    HTN (hypertension) 08/19/2022    Multiple open wounds of lower leg, initial encounter 08/19/2022     Past Medical History:   Diagnosis Date    Severe anxiety 08/19/2022    Super-super obese ([facility]      History reviewed. No pertinent surgical history. Family History   Problem Relation Age of Onset    Hypertension Mother     Anxiety disorder Mother      Social History     Socioeconomic History    Marital status: SINGLE     Spouse name: Not on file    Number of children: Not on file    Years of education: Not on file    Highest education level: Not on file   Occupational History    Not on file   Tobacco Use    Smoking status: Not on file    Smokeless tobacco: Not on file   Substance and Sexual Activity    Alcohol use: Not on file    Drug use: Not on file    Sexual activity: Not on file   Other Topics Concern    Not on file   Social History Narrative    Not on file     Social Determinants of Health     Financial Resource Strain: Not on file   Food Insecurity: Not on file   Transportation Needs: Not on file   Physical Activity: Not on file   Stress: Not on file   Social Connections: Not on file   Intimate Partner Violence: Not on file   Housing Stability: Not on file       Allergies:  Patient has no known allergies.      Medications:  Current Facility-Administered Medications   Medication Dose Route Frequency    diphenhydrAMINE (BENADRYL) injection 12.5 mg  12.5 mg IntraVENous Q6H PRN    enoxaparin (LOVENOX) injection 40 mg  40 mg SubCUTAneous Q24H    albumin human 25% (BUMINATE) solution 12.5 g  12.5 g IntraVENous Q6H    calcium carbonate (OS-ZHEN) tablet 500 mg [elemental]  500 mg Oral TID WITH MEALS    nystatin (MYCOSTATIN) 100,000 unit/gram powder   Topical BID    morphine injection 2 mg  2 mg IntraVENous Q4H PRN oxyCODONE IR (ROXICODONE) tablet 10 mg  10 mg Oral Q6H PRN    citalopram (CELEXA) tablet 10 mg  10 mg Oral DAILY    traZODone (DESYREL) tablet 50 mg  50 mg Oral QHS PRN    sodium chloride (NS) flush 5-40 mL  5-40 mL IntraVENous Q8H    sodium chloride (NS) flush 5-40 mL  5-40 mL IntraVENous PRN    acetaminophen (TYLENOL) tablet 650 mg  650 mg Oral Q6H PRN    Or    acetaminophen (TYLENOL) suppository 650 mg  650 mg Rectal Q6H PRN    polyethylene glycol (MIRALAX) packet 17 g  17 g Oral DAILY PRN    ondansetron (ZOFRAN ODT) tablet 4 mg  4 mg Oral Q8H PRN    Or    ondansetron (ZOFRAN) injection 4 mg  4 mg IntraVENous Q6H PRN    hydrALAZINE (APRESOLINE) 20 mg/mL injection 10 mg  10 mg IntraVENous Q6H PRN    glucose chewable tablet 16 g  16 g Oral PRN    glucagon (GLUCAGEN) injection 1 mg  1 mg IntraMUSCular PRN    dextrose 10% infusion 0-250 mL  0-250 mL IntraVENous PRN    [Held by provider] 0.9% sodium chloride infusion  100 mL/hr IntraVENous CONTINUOUS    metoprolol tartrate (LOPRESSOR) tablet 12.5 mg  12.5 mg Oral Q12H          Physical Exam:    Temp (24hrs), Av.3 °F (36.8 °C), Min:98 °F (36.7 °C), Max:98.6 °F (37 °C)    Visit Vitals  /63   Pulse 99   Temp 98.3 °F (36.8 °C)   Resp 17   Ht 5' 6\" (1.676 m)   Wt (!) 257 kg (566 lb 9.3 oz)   SpO2 94%   Breastfeeding No   BMI 91.45 kg/m²          GEN: WD Morbidly Obese, on RA--not in resp distress. HEENT: Unicteric. EOMI intact  --no neck swelling  CHEST: Non laboured breathing. ABD: Obese/soft. Non tender. Morbidly obese  SO: Purwick in place  EXT: No apparent swelling or redness on UE/LE joints. Skin: Dry and intact. Lower leg dressed and wound not seen-- reviewed with wound team   CNS: A, OX3. Moves all extremity. CN grossly ok.     Microbiology  All Micro Results       Procedure Component Value Units Date/Time    CULTURE, BLOOD [[id number]] Collected: 22    Order Status: Completed Specimen: Blood Updated: 22     Special Requests: NO SPECIAL REQUESTS        Culture result: NO GROWTH 6 DAYS       CULTURE, BLOOD 2nd DRAW (required for [facility]) [[id number]] Collected: 08/19/22 1900    Order Status: Completed Specimen: Blood Updated: 08/25/22 0826     Special Requests: NO SPECIAL REQUESTS        Culture result: NO GROWTH 6 DAYS       CULTURE, WOUND Cade Dinh STAIN [[id number]]  (Abnormal)  (Susceptibility) Collected: 08/19/22 1845    Order Status: Completed Specimen: Wound from Leg Updated: 08/24/22 1542     Special Requests: NO SPECIAL REQUESTS        GRAM STAIN NO WBC'S SEEN         3+ GRAM NEGATIVE RODS               1+ GRAM POSITIVE COCCI IN PAIRS           Culture result:       HEAVY Myroides sp ** MULTI-DRUG RESISTANT ORGANISM **                  MODERATE STAPHYLOCOCCUS AUREUS                  HEAVY  MIXED ENTERIC GRAM NEGATIVE RODS        (NOTE) MDRO CALLED TO Evelin Soni RN AT 1540 8/24/22. LWY    CULTURE, VIRAL [[id number]] Collected: 08/20/22 1115    Order Status: Completed Specimen: Other from Miscellaneous sample Updated: 08/22/22 1237     Source LEG        Viral Culture, General Comment        Comment: (NOTE)  Preliminary Report:  No virus isolated at 24 hours. Next report to follow after 4 days.   Performed At: [facility] & [address] [id number]  Arnulfo Mathews MD LJ:[id number]         CULTURE, URINE [[id number]] Collected: 08/19/22 1815    Order Status: Canceled Specimen: Urine from Clean catch               Lab results:    Chemistry  Recent Labs     08/24/22  0406 08/23/22  0930   GLU 87 86    138   K 4.0 4.0    109   CO2 25 22   BUN 6* 6*   CREA 0.70 0.58*   CA 8.6 8.6   AGAP 5 7   BUCR 9* 10*   AP 68 73   TP 6.3* 7.0   ALB 2.3* 2.2*   GLOB 4.0 4.8*   AGRAT 0.6* 0.5*       CBC w/ Diff  Recent Labs     08/24/22  0406 08/23/22  0930   WBC 8.2 9.2   RBC 2.96* 3.21*   HGB 7.7* 8.3*   HCT 25.5* 27.4*    359       XR TIB/FIB LT    Result Date: 8/19/2022  EXAM: 2 views of bilateral tibia fibula CLINICAL INDICATION/HISTORY: Bilateral leg pain COMPARISON: None. _______________ FINDINGS: No fracture or dislocation. Degenerative changes in the knees. Patient is morbidly obese with diffuse soft tissue edema and reticulation involving both lower legs. Diffuse mottling of the soft tissues bilaterally with patchy lucencies identified. _______________     1. Morbid obesity with diffuse soft tissue edema and reticulation involving both lower legs. Mottled appearance of the soft tissues with patchy lucencies bilaterally. Difficult to know if this relates to soft tissue gas, lower extremity wounds, and/or mottled appearance from extensive edema. XR TIB/FIB RT    Result Date: 8/19/2022  EXAM: 2 views of bilateral tibia fibula CLINICAL INDICATION/HISTORY: Bilateral leg pain COMPARISON: None. _______________ FINDINGS: No fracture or dislocation. Degenerative changes in the knees. Patient is morbidly obese with diffuse soft tissue edema and reticulation involving both lower legs. Diffuse mottling of the soft tissues bilaterally with patchy lucencies identified. _______________     1. Morbid obesity with diffuse soft tissue edema and reticulation involving both lower legs. Mottled appearance of the soft tissues with patchy lucencies bilaterally. Difficult to know if this relates to soft tissue gas, lower extremity wounds, and/or mottled appearance from extensive edema. XR CHEST PORT    Result Date: 8/19/2022  EXAM: CHEST RADIOGRAPH CLINICAL INDICATION/HISTORY: sepsis   > Additional: Leg wounds, bilateral leg pain COMPARISON: None TECHNIQUE: Portable frontal view of the chest _______________ FINDINGS: SUPPORT DEVICES: None. HEART AND MEDIASTINUM: Heart size is normal. LUNGS AND PLEURAL SPACES: No focal consolidation, effusion, or pneumothorax. BONES AND SOFT TISSUES: Unremarkable. _______________     No active cardiopulmonary disease.     ECHO ADULT COMPLETE    Result Date: 8/22/2022  Formatting of this result is different from the original.   Left Ventricle: Normal left ventricular systolic function with a visually estimated EF of 55 - 60%. Left ventricle size is normal. Normal wall thickness. Unable to assess wall motion. Normal diastolic function. Right Ventricle: Not well visualized. Technical qualifiers: Echo study was technically difficult due to patient's body habitus.      Procedures/imaging: see electronic medical records for all procedures/Xrays and details which were not copied into this note but were reviewed prior to creation of Plan

## 2022-08-25 NOTE — PROGRESS NOTES
Problem: Mobility Impaired (Adult and Pediatric)  Goal: *Acute Goals and Plan of Care (Insert Text)  Description: Physical Therapy Goals   Initiated 8/23/2022 and to be accomplished within 7 day(s)  1. Patient will move from supine <> sit with CGA in prep for out of bed activity and change of position. 2.  Patient will perform sit<> stand with CGA/RW in prep for transfers/ambulation. 3.  Patient will ambulate 10 feet with CGA/RW for improved functional mobility at discharge. 4.  Patient will participate with HEP for accurate performance post discharge. Outcome: Progressing Towards Goal   PHYSICAL THERAPY TREATMENT    Patient: Porsche Gray (25 y.o. female)  Date: 8/25/2022  Diagnosis: Cellulitis and abscess of right leg [L03.115, L02.415]  Cellulitis and abscess of left leg [L03.116, L02.416] Bilateral lower leg cellulitis    Precautions: Fall, Skin  PLOF: ambulatory short distances    ASSESSMENT:  Pt anxious about getting up. VC to breathe. Deflated mattress to assist with bed mobility. Pt able to sit up EOB with use of bed rail and trapeze with additional time to carry out. Pt needed increased time at EOB to calm down before standing. Performed 5 LAQs on (B)LEs. 2 standing trials with BRW, 1st for 20 seconds, 2nd pt able to take small side steps along EOB. Brandon with LEs back into supine. Bed placed in trendelenburg and pt able to pull self up to [facility]. Progression toward goals:   []      Improving appropriately and progressing toward goals  [x]      Improving slowly and progressing toward goals  []      Not making progress toward goals and plan of care will be adjusted     PLAN:  Patient continues to benefit from skilled intervention to address the above impairments. Continue treatment per established plan of care.   Discharge Recommendations:  Home Physical Therapy  Further Equipment Recommendations for Discharge:  [employer] donated to pt     SUBJECTIVE:   Patient stated ok.    OBJECTIVE DATA SUMMARY:   Critical Behavior:  Neurologic State: Alert  Orientation Level: Oriented X4  Cognition: Appropriate decision making  Safety/Judgement: Awareness of environment, Fall prevention  Functional Mobility Training:  Bed Mobility:  Rolling: Modified independent  Supine to Sit: Modified independent; Additional time  Sit to Supine: Minimum assistance  Scooting: Contact guard assistance; Additional time  Transfers:  Sit to Stand: Contact guard assistance;Minimum assistance  Stand to Sit: Contact guard assistance  Balance:  Sitting: Intact  Standing: Intact; With support   Ambulation/Gait Training:  Distance (ft): 2 Feet (ft)  Assistive Device: Gait belt;Walker, rolling  Ambulation - Level of Assistance: Contact guard assistance;Minimal assistance  Gait Abnormalities: Decreased step clearance  Right Side Weight Bearing: As tolerated  Left Side Weight Bearing: As tolerated  Base of Support: Widened  Speed/Alexia: Delayed  Step Length: Right shortened;Left shortened        Pain:  Pain level pre-treatment: 0/10  Pain level post-treatment: 0/10   Pain Intervention(s): Medication (see MAR); Rest, Ice, Repositioning   Response to intervention: Nurse notified, See doc flow    Activity Tolerance:   Fair, anxious  Please refer to the flowsheet for vital signs taken during this treatment. After treatment:   [] Patient left in no apparent distress sitting up in chair  [x] Patient left in no apparent distress in bed  [x] Call bell left within reach  [] Nursing notified  [] Caregiver present  [] Bed alarm activated  [] SCDs applied      COMMUNICATION/EDUCATION:   [x]         Role of Physical Therapy in the acute care setting. [x]         Fall prevention education was provided and the patient/caregiver indicated understanding. [x]         Patient/family have participated as able in working toward goals and plan of care. [x]         Patient/family agree to work toward stated goals and plan of care.   [] Patient understands intent and goals of therapy, but is neutral about his/her participation.   []         Patient is unable to participate in stated goals/plan of care: ongoing with therapy staff.  []         Other:        Jessica Mercado PTA   Time Calculation: 43 mins

## 2022-08-26 ENCOUNTER — HOME HEALTH ADMISSION (OUTPATIENT)
Dept: HOME HEALTH SERVICES | Facility: HOME HEALTH | Age: 22
End: 2022-08-26

## 2022-08-26 VITALS
WEIGHT: 293 LBS | HEART RATE: 88 BPM | TEMPERATURE: 98.2 F | RESPIRATION RATE: 18 BRPM | BODY MASS INDEX: 47.09 KG/M2 | OXYGEN SATURATION: 100 % | HEIGHT: 66 IN | DIASTOLIC BLOOD PRESSURE: 70 MMHG | SYSTOLIC BLOOD PRESSURE: 118 MMHG

## 2022-08-26 PROCEDURE — 74011250637 HC RX REV CODE- 250/637: Performed by: INTERNAL MEDICINE

## 2022-08-26 PROCEDURE — 74011250637 HC RX REV CODE- 250/637: Performed by: FAMILY MEDICINE

## 2022-08-26 PROCEDURE — 74011250636 HC RX REV CODE- 250/636: Performed by: INTERNAL MEDICINE

## 2022-08-26 PROCEDURE — P9047 ALBUMIN (HUMAN), 25%, 50ML: HCPCS | Performed by: INTERNAL MEDICINE

## 2022-08-26 RX ORDER — NYSTATIN 100000 [USP'U]/G
POWDER TOPICAL 2 TIMES DAILY
Qty: 120 G | Refills: 0 | Status: SHIPPED | OUTPATIENT
Start: 2022-08-26

## 2022-08-26 RX ORDER — ACETAMINOPHEN 500 MG
500 TABLET ORAL
Qty: 30 TABLET | Refills: 0 | Status: SHIPPED | OUTPATIENT
Start: 2022-08-26

## 2022-08-26 RX ORDER — OXYCODONE HYDROCHLORIDE 10 MG/1
10 TABLET ORAL
Qty: 10 TABLET | Refills: 0 | Status: SHIPPED | OUTPATIENT
Start: 2022-08-26 | End: 2022-08-29

## 2022-08-26 RX ORDER — CALCIUM CARBONATE 500(1250)
1 TABLET ORAL
Qty: 90 TABLET | Refills: 1 | Status: SHIPPED | OUTPATIENT
Start: 2022-08-26

## 2022-08-26 RX ORDER — CITALOPRAM 10 MG/1
10 TABLET ORAL DAILY
Qty: 30 TABLET | Refills: 0 | Status: SHIPPED | OUTPATIENT
Start: 2022-08-27

## 2022-08-26 RX ORDER — METOPROLOL TARTRATE 25 MG/1
12.5 TABLET, FILM COATED ORAL EVERY 12 HOURS
Qty: 30 TABLET | Refills: 0 | Status: SHIPPED | OUTPATIENT
Start: 2022-08-26

## 2022-08-26 RX ADMIN — ALBUMIN (HUMAN) 12.5 G: 0.25 INJECTION, SOLUTION INTRAVENOUS at 05:54

## 2022-08-26 RX ADMIN — METOPROLOL TARTRATE 12.5 MG: 25 TABLET, FILM COATED ORAL at 09:39

## 2022-08-26 RX ADMIN — CITALOPRAM HYDROBROMIDE 10 MG: 10 TABLET ORAL at 09:39

## 2022-08-26 RX ADMIN — CALCIUM 500 MG: 500 TABLET ORAL at 09:39

## 2022-08-26 RX ADMIN — NYSTATIN: 100000 POWDER TOPICAL at 09:00

## 2022-08-26 NOTE — PROGRESS NOTES
Problem: Pressure Injury - Risk of  Goal: *Prevention of pressure injury  Description: Document Bryon Scale and appropriate interventions in the flowsheet. Outcome: Resolved/Met     Problem: Patient Education: Go to Patient Education Activity  Goal: Patient/Family Education  Outcome: Resolved/Met     Problem: Falls - Risk of  Goal: *Absence of Falls  Description: Document Nathaniel Fall Risk and appropriate interventions in the flowsheet.   Outcome: Resolved/Met     Problem: Patient Education: Go to Patient Education Activity  Goal: Patient/Family Education  Outcome: Resolved/Met     Problem: General Infection Care Plan (Adult and Pediatric)  Goal: Improvement in signs and symptoms of infection  Outcome: Resolved/Met  Goal: *Optimize nutritional status  Outcome: Resolved/Met     Problem: Patient Education: Go to Patient Education Activity  Goal: Patient/Family Education  Outcome: Resolved/Met     Problem: General Medical Care Plan  Goal: *Vital signs within specified parameters  Outcome: Resolved/Met  Goal: *Labs within defined limits  Outcome: Resolved/Met  Goal: *Absence of infection signs and symptoms  Outcome: Resolved/Met  Goal: *Optimal pain control at patient's stated goal  Outcome: Resolved/Met  Goal: *Skin integrity maintained  Outcome: Resolved/Met  Goal: *Fluid volume balance  Outcome: Resolved/Met  Goal: *Optimize nutritional status  Outcome: Resolved/Met  Goal: *Anxiety reduced or absent  Outcome: Resolved/Met  Goal: *Progressive mobility and function (eg: ADL's)  Outcome: Resolved/Met     Problem: Patient Education: Go to Patient Education Activity  Goal: Patient/Family Education  Outcome: Resolved/Met     Problem: Patient Education: Go to Patient Education Activity  Goal: Patient/Family Education  Outcome: Resolved/Met     Problem: Patient Education: Go to Patient Education Activity  Goal: Patient/Family Education  Outcome: Resolved/Met

## 2022-08-26 NOTE — PROGRESS NOTES
D/C plan: Home w/ [address] BLS transport. CM met w/ pt at bedside. Confirmed address on pt's face sheet and updated it in the system. The pt is aware she will d/c home today. CM received approval from CM Director on 08/25//22 for transport home.      Care Management Interventions  PCP Verified by CM:  (Pt has a follow-up appt set up with Glenn Carmichael. )  Mode of Transport at Discharge: BLS (.)  Transition of Care Consult (CM Consult): [address]: Yes  Discharge Durable Medical Equipment: Yes (Bariatric RW)  Physical Therapy Consult: Yes  Occupational Therapy Consult: Yes  Support Systems: Spouse/Significant Other, Parent(s), Other Family Member(s)  The Plan for Transition of Care is Related to the Following Treatment Goals : Home w/ [address] and f/u at [facility] wound care clinic  The Patient and/or Patient Representative was Provided with a Choice of Provider and Agrees with the Discharge Plan?:  (The pt is alert and oriented)  Freedom of Choice List was Provided with Basic Dialogue that Supports the Patient's Individualized Plan of Care/Goals, Treatment Preferences and Shares the Quality Data Associated with the Providers?: Yes ([address] )  Discharge Location  Patient Expects to be Discharged to[de-identified] Home with home health

## 2022-08-26 NOTE — PROGRESS NOTES
Bedside shift change report given to [address] [place] [address] Elizabeth Couch (oncoming nurse) by Shahid Edge RN   (offgoing nurse). Report included the following information SBAR, Kardex, Intake/Output, MAR, and Recent Results.

## 2022-08-26 NOTE — DISCHARGE SUMMARY
[address]    Name:  Landon Adame  MR#:   [id number]  :  2000  ACCOUNT #:  [de-identified]  ADMIT DATE:  2022  DISCHARGE DATE:  2022      DISPOSITION:  The patient is being transported home via ambulance on stretcher. DISCHARGE DIAGNOSES:  1. Lymphedema of the lower extremities. 2.  Super morbid obesity with BMI over 80.  3. Anxiety disorder. 4.  Acute kidney injury. 5.  Hypertension. 6.  Hyperglycemia. HOSPITAL SUMMARY:  This is a 50-year-old unfortunate  female who is very obese. She has difficulty leaving her house. She had had weeping leg wounds for the past several months that her sister was helping her manage. She lives at home with multiple family members. They had used diluted bleach to clean some of the wounds. She had not been to see a doctor in 3 years. With that, the patient was admitted to the hospital for management of the wounds. Wound Care consult, Infectious Disease consult. She had a course of antibiotics recommended by the Infectious Disease specialist.  She now has Unna boots in place. She has been followed here by the medical team and prepared for discharge to home which has been arranged by the . This is a young woman who at this point is 566 pounds. She has normal vital signs, blood pressure is well controlled at 118/70 today, pulse 88, temperature 98.2, respiratory rate 18, 100% saturating on room air. She is anxious, but in no acute distress. She has no new complaints. Her Unna boots are wrapping her largely lymphedematous legs. This morning, her abdomen is benign, soft, nontender. Her cardiac and lung exams are normal.  H and H is 7.7 and 25.5. It does look like she has acute-on-chronic anemia likely worsened by menstruation. Duplex of the lower extremities showed no DVT in either leg, and the patient had an echocardiogram that showed an EF of 55-60%.   Unfortunately, she is in a situation where her continued comorbid conditions will increase with the nature of her weight at this time. I hope that she will adhere to the appointments made for her in followup which includes [facility] physicians on 08/30, with Dr. Latosha Pineda for primary care followup, the [facility] on 08/30, and [facility] has been arranged for her as well. For discharge, she should continue on the Celexa 10 mg daily for depression and anxiety, Lopressor 12.5 mg twice daily, nystatin powder to affected areas twice daily. I have prescribed 10 tablets of oxycodone one every 8 hours as needed for severe pain that is not resolved by Tylenol Extra Strength. Continue calcium carbonate 500 mg three times daily. The patient is stable for release from the hospital today with appropriate care. Arrangements made for followup and at home as needed. Recommend obviously healthy low-fat, low-sodium diet for her and strong consideration for evaluation for bariatric intervention as an outpatient, but in the meantime, she is medically stable for release from the hospital.  36 minutes were spent on her discharge this morning.       Farzana Velazquez MD      [id number]  D:  08/26/2022 14:40  T:  08/26/2022 14:58  JOB #:  [id number]

## 2022-08-26 NOTE — PROGRESS NOTES
Comprehensive Nutrition Assessment    Type and Reason for Visit: Initial, RD nutrition re-screen/LOS    Nutrition Recommendations/Plan:   Continue current diet order. Will add Ensure Compact and Prosource. Once daily each      Malnutrition Assessment:  Malnutrition Status: At risk for malnutrition (specify) (08/26/22 1053)        Nutrition Assessment:    21yo F. Morbid obesity, severe anxiety. Admitted 8/19/22 with bilateral weeping leg wounds. Wt hx per chart: 523lb 8/19/22, 563lb 8/24/22. Per chart- meal intake %. Pt states poor appetite and intake (~50%). Will order supplement to help meet nutrtion needs. Followed up with pt regarding weight management education on 8/24/22. Pt had no questions or concerns regarding education. Nutrition Related Findings:    Labs/meds: calcium carbonate TID, BM 8/23/22. Wound Type: Full thickness, Multiple    Current Nutrition Intake & Therapies:  Average Meal Intake: 26-50%  Average Supplement Intake: None ordered  ADULT DIET Regular; 3 carb choices (45 gm/meal)    Anthropometric Measures:  Height: 5' 6\" (167.6 cm)  Ideal Body Weight (IBW): 130 lbs (59 kg)  Admission Body Weight: 523 lb  Current Body Wt:  257 kg (566 lb 9.3 oz),   IBW.  Bed scale  Current BMI (kg/m2): 91.5  Usual Body Weight: 237.2 kg (523 lb)  % Weight Change (Calculated): 8.3                    BMI Category: Obese class 3 (BMI 40.0 or greater)    Estimated Daily Nutrient Needs:  Energy Requirements Based On: Kcal/kg  Weight Used for Energy Requirements: Ideal  Energy (kcal/day): 1770     Protein (g/day):   Method Used for Fluid Requirements: 1 ml/kcal  Fluid (ml/day): 9173    Nutrition Diagnosis:   Inadequate energy intake related to acute injury/trauma as evidenced by intake 26-50%, wounds    Nutrition Interventions:   Food and/or Nutrient Delivery: Continue current diet, Start oral nutrition supplement  Nutrition Education/Counseling: No recommendations at this time  Coordination of Nutrition Care: Continue to monitor while inpatient       Goals:     Goals: by next RD assessment, PO intake 50% or greater       Nutrition Monitoring and Evaluation:   Behavioral-Environmental Outcomes: None identified  Food/Nutrient Intake Outcomes: Food and nutrient intake, Supplement intake  Physical Signs/Symptoms Outcomes: Biochemical data, Meal time behavior, GI status, Weight, Skin    Discharge Planning:    Continue current diet    Anand Damico RD

## 2022-08-26 NOTE — PROGRESS NOTES
Problem: Pressure Injury - Risk of  Goal: *Prevention of pressure injury  Description: Document Bryon Scale and appropriate interventions in the flowsheet. Outcome: Progressing Towards Goal  Note: Pressure Injury Interventions:       Moisture Interventions: Absorbent underpads    Activity Interventions: Pressure redistribution bed/mattress(bed type)    Mobility Interventions: Pressure redistribution bed/mattress (bed type)    Nutrition Interventions: Document food/fluid/supplement intake    Friction and Shear Interventions: Apply protective barrier, creams and emollients                Problem: Falls - Risk of  Goal: *Absence of Falls  Description: Document Nathaniel Fall Risk and appropriate interventions in the flowsheet.   Outcome: Progressing Towards Goal  Note: Fall Risk Interventions:  Mobility Interventions: Patient to call before getting OOB         Medication Interventions: Evaluate medications/consider consulting pharmacy    Elimination Interventions: Patient to call for help with toileting needs    History of Falls Interventions: Room close to nurse's station         Problem: General Infection Care Plan (Adult and Pediatric)  Goal: Improvement in signs and symptoms of infection  Outcome: Progressing Towards Goal     Problem: General Medical Care Plan  Goal: *Optimal pain control at patient's stated goal  Outcome: Progressing Towards Goal

## 2022-08-29 ENCOUNTER — HOME CARE VISIT (OUTPATIENT)
Dept: HOME HEALTH SERVICES | Facility: HOME HEALTH | Age: 22
End: 2022-08-29

## 2022-08-29 LAB
SPECIMEN SOURCE: NORMAL
VIRUS SPEC CULT: NORMAL

## 2022-08-29 NOTE — PROGRESS NOTES
Physician Progress Note      PATIENT:               Donna Ojeda  CSN #:                  [id number]  :                       2000  ADMIT DATE:       2022 6:00 PM  [address] [place] DATE:        2022 2:33 PM  RESPONDING  PROVIDER #:        Zoila Gutierrez MD          QUERY TEXT:    Patient admitted with open leg wounds and super obesity. Noted documentation of Cellulitis bilateral leg wounds by Hospitalist notes and H&P and  Venous stasis/lymphedema by Infectious Disease consultant. If possible, please document in progress notes and discharge summary if you are evaluating and /or treating any of the following: The medical record reflects the following:  Risk Factors: 25 y o  female with BMI 84, anxiety    Clinical Indicators: Per H&P and Hospital progress notes Bilateral lower leg cellulitis with multiple open wounds of her legs     Infectious disease consult Doubt cellulitis, B/L leg wounds, extensive skin loss- posterior leg-> with bleeding ulcers at this time. Underlying venous stasis/lymphedema    Treatment: ID consult, local wound care, IV antibiotics, IV fluids      Thank you for your time,    Annika BIRMINGHAM, RN, [address] [place] [address]. [address]  C: [phone]    [email or web address]  Options provided:  -- Bilateral LE cellulitis confirmed and venous stasis ulcers ruled out  -- Venous stasis ulcers confirmed and bilateral LE cellulitis ruled out  -- Other - I will add my own diagnosis  -- Disagree - Not applicable / Not valid  -- Disagree - Clinically unable to determine / Unknown  -- Refer to Clinical Documentation Reviewer    PROVIDER RESPONSE TEXT:    After study, Venous stasis ulcers confirmed and bilateral LE cellulitis ruled out.     Query created by: Josselyn Eubanks on 2022 3:17 PM      Electronically signed by:  Zoila Gutierrez MD 2022 1:10 PM

## 2022-08-29 NOTE — CASE COMMUNICATION
I called patient to schedule but her phone mailbox was full. I called and spoke with her sister to set up a time between 1-2 and she called back later and LM on my phone that her sister could not be seen today without reason. I will put in late [facility] orders and reschedule.

## 2022-08-30 ENCOUNTER — HOSPITAL ENCOUNTER (OUTPATIENT)
Dept: WOUND CARE | Age: 22
Discharge: HOME | End: 2022-08-30
Attending: PODIATRIST

## 2022-08-30 ENCOUNTER — HOME CARE VISIT (OUTPATIENT)
Dept: HOME HEALTH SERVICES | Facility: HOME HEALTH | Age: 22
End: 2022-08-30

## 2022-08-30 VITALS
TEMPERATURE: 98.5 F | DIASTOLIC BLOOD PRESSURE: 78 MMHG | OXYGEN SATURATION: 99 % | RESPIRATION RATE: 20 BRPM | HEART RATE: 112 BPM | SYSTOLIC BLOOD PRESSURE: 133 MMHG

## 2022-08-30 DIAGNOSIS — L03.115 BILATERAL LOWER LEG CELLULITIS: ICD-10-CM

## 2022-08-30 DIAGNOSIS — L03.116 BILATERAL LOWER LEG CELLULITIS: ICD-10-CM

## 2022-08-30 DIAGNOSIS — S81.809A MULTIPLE OPEN WOUNDS OF LOWER LEG, INITIAL ENCOUNTER: ICD-10-CM

## 2022-08-30 DIAGNOSIS — I89.0 LYMPHEDEMA: Primary | ICD-10-CM

## 2022-08-30 PROCEDURE — 11042 DBRDMT SUBQ TIS 1ST 20SQCM/<: CPT

## 2022-08-30 RX ORDER — CLOBETASOL PROPIONATE 0.5 MG/G
OINTMENT TOPICAL ONCE
Status: CANCELLED | OUTPATIENT
Start: 2022-08-30 | End: 2022-08-30

## 2022-08-30 RX ORDER — LIDOCAINE 50 MG/G
OINTMENT TOPICAL ONCE
Status: CANCELLED | OUTPATIENT
Start: 2022-08-30 | End: 2022-08-30

## 2022-08-30 RX ORDER — LIDOCAINE HYDROCHLORIDE 40 MG/ML
SOLUTION TOPICAL ONCE
Status: CANCELLED | OUTPATIENT
Start: 2022-08-30 | End: 2022-08-30

## 2022-08-30 RX ORDER — LIDOCAINE HYDROCHLORIDE 20 MG/ML
JELLY TOPICAL ONCE
Status: CANCELLED | OUTPATIENT
Start: 2022-08-30 | End: 2022-08-30

## 2022-08-30 RX ORDER — MUPIROCIN 20 MG/G
OINTMENT TOPICAL ONCE
Status: CANCELLED | OUTPATIENT
Start: 2022-08-30 | End: 2022-08-30

## 2022-08-30 RX ORDER — LIDOCAINE 40 MG/G
CREAM TOPICAL ONCE
Status: CANCELLED | OUTPATIENT
Start: 2022-08-30 | End: 2022-08-30

## 2022-08-30 RX ORDER — BACITRACIN ZINC AND POLYMYXIN B SULFATE 500; 1000 [USP'U]/G; [USP'U]/G
OINTMENT TOPICAL ONCE
Status: CANCELLED | OUTPATIENT
Start: 2022-08-30 | End: 2022-08-30

## 2022-08-30 RX ORDER — LIDOCAINE HYDROCHLORIDE 20 MG/ML
JELLY TOPICAL ONCE
Status: COMPLETED | OUTPATIENT
Start: 2022-08-30 | End: 2022-08-30

## 2022-08-30 RX ADMIN — LIDOCAINE HYDROCHLORIDE 5 ML: 20 JELLY TOPICAL at 15:30

## 2022-08-30 NOTE — CASE COMMUNICATION
I called patient and no ability to leave VM because her mailbox is full. I called the sister and LM but she did not return the call for scheduling.

## 2022-08-30 NOTE — PROGRESS NOTES
[address] [facility]  Initial Consult note    Subjective:     Chief Complaint:  Yon Celaya is a 25 y.o.  female who presents with R lower leg posterior, L lower leg posterior wound of few weeks duration. Referred by:  in patient     HPI:   morbid obesity with chronic edematous stasis wounds  Wound caused by: none  Current wound care:  Offloading wound: yes and n/a  Appetite: good  Wound associated pain: none  Diabetic: no  Smoker: no  ROS: no N/V, no T/chills; no local rash  Past Medical History:   Diagnosis Date    Severe anxiety 08/19/2022    Super-super obese ([facility] [address]       No past surgical history on file. Family History   Problem Relation Age of Onset    Hypertension Mother     Anxiety disorder Mother       Social History     Tobacco Use    Smoking status: Not on file    Smokeless tobacco: Not on file   Substance Use Topics    Alcohol use: Not on file       Prior to Admission medications    Medication Sig Start Date End Date Taking? Authorizing Provider   citalopram (CELEXA) 10 mg tablet Take 1 Tablet by mouth daily. 8/27/22   Yojana Foreman MD   calcium carbonate (OS-ZHEN) 500 mg calcium (1,250 mg) tablet Take 1 Tablet by mouth three (3) times daily (with meals). 8/26/22   Yojana Foreman MD   metoprolol tartrate (LOPRESSOR) 25 mg tablet Take 0.5 Tablets by mouth every twelve (12) hours. 8/26/22   Yojana Foreman MD   nystatin (MYCOSTATIN) powder Apply  to affected area two (2) times a day. 8/26/22   Yojana Foreman MD   oxyCODONE IR (ROXICODONE) 10 mg tab immediate release tablet Take 1 Tablet by mouth every eight (8) hours as needed for Pain (severe pain not controlled with tylenol) for up to 3 days. Max Daily Amount: 30 mg. 8/26/22 8/29/22  Yojana Foreman MD   acetaminophen (Tylenol Extra Strength) 500 mg tablet Take 1 Tablet by mouth every six (6) hours as needed for Pain.  8/26/22   Yojana Foreman MD     No Known Allergies     Review of Systems  Gen: No fever, chills, malaise, weight loss/gain. Derm: see above   Musc/skeletal: no bone or joint complains. Vasc: No edema, cyanosis or claudication. Endo: No heat/cold intolerance, no polyuria,polydipsia or polyphagia. HgbA1c:  Advance Care plan:     Counseling re nutrition not done. Current meds documented in chart  Pain:     Counseling re smoking cessation not done. Blood pressure: noted below     Review of Systems:  Pertinent items are noted in the History of Present Illness. Objective:     Physical Exam:     There were no vitals taken for this visit. General: well developed, well nourished, pleasant , NAD. Hygiene good  Psych: cooperative. Pleasant. No anxiety or depression. Normal mood and affect. Neuro: alert and oriented to person/place/situation. Otherwise nonfocal.  Derm: Skin turgor for age, dry skin  Lower extremities: color normal; temperature normal.Hair growth is not present. Calves are supple, nontender, approximately equally sized in comparison. Capillary refill <3 sec  Focussed Lower Extremity Exam:  Vascular exam & Nails dystrophic:  Bilateral lower extremity: severe  edema, foot mild,   DP pulse : n/a  PT pulse: n/a    Ulcer Location: R lower leg posterior, L lower leg posterior   Etiology: venous stasis  Wound Length:    Wound Width :   Wound Depth :   Ulcer bed: Mixed Granular/Fibrotic    Periwound: Reddened  Exudate: Moderate amount Serosanguinous exudate  Depth of Wound: To Fat Layer     Data Review:   No results found for this or any previous visit (from the past 24 hour(s)).       Assessment:     Patient Active Problem List   Diagnosis Code    Super-super obese ([facility] [address] E66.01    Bilateral lower leg cellulitis L03.116, L03.115    Hyperglycemia R73.9    Hyponatremia E87.1    GUCCI (acute kidney injury) ([facility] [address] N17.9    Severe anxiety F41.9    HTN (hypertension) I10    Multiple open wounds of lower leg, initial encounter S81.809A    Lymphedema I89.0     25 y.o. female with stasis edema with chronic wounds    Needs :  Serial debridement- debrided today- see note below  Good local wound care  Edema management  Nutrition optimization  Good Diabetic control    Plan:     In wound care clinic today:  Cleanse wound with NS or soap and water or commercial wound cleanser  Apply the following topically to wound bed:  Apply the following to brain-wound: NA  Apply the following dressings: Absorptive dressing    For Home Care/Self Care:  Cleanse wound with NS or soap and water or commercial wound cleanser  Keep dressing dry and intact when bathing  Apply the following to wound bed:  Apply the following to skin around wound: NA  Apply the following dressings: Absorptive dressing    Leave dressings in place until next visit    Edema Control:   Elevate legs as much as possible. Avoid standing in one position for more than 10 minutes. Avoid setting with legs down. Do not cross legs while sitting. Off-Loading:     Frequent position changes. Do not cross legs while sitting. Shift weight every 20 minutes or more when sitting for prolonged periods of time. Patient to return for wound care in:   Days  Follow up with Nurse visit as recommended. PLEASE CONTACT OFFICE AS SOON AS POSSIBLE IF UNABLE TO MAKE THIS APPOINTMENT. Inspect your wounds, looking for signs of infection which may include the following:  Increase in redness  Red \"streaks\" from wound  Increase in swelling  Fever  Unusual odor  Change in the amount of wound drainage. Should you experience any significant changes in your wound(s) or have any questions regarding your home care instructions please contact the wound center or your home health company. If after regular business hours, please call your family doctor or local emergency room. Patient understood and agrees with plan. Questions answered.     Signed By: Ivy Joseph DPM     August 30, 2022         Debridement Wound Care        Problem List Items Addressed This Visit    None      Procedure Note  Indications: Based on my examination of this patient's wound(s)/ulcer(s) today, debridement is required to promote healing and evaluate the wound base.     Performed by: Dinah Hendricks DPM    Consent obtained: Yes    Time out taken: Yes    Debridement: Excisional    Using curette the wound(s)/ulcer(s) was/were sharply debrided down through and including the removal of    epidermis, dermis, and subcutaneous tissue    Devitalized Tissue Debrided: fibrin, biofilm, and slough    Pre Debridement Measurements:  Are located in the [place]  Documentation Flow Sheet    Non-Pressure ulcer, fat layer exposed    Wound/Ulcer #: 1 and 2    Post Debridement Measurements:  Wound/Ulcer Descriptions are Pre Debridement except measurements:    Wound Calf Left 08/22/22 (Active)   Number of days: 8       Wound Calf Right 08/22/22 (Active)   Number of days: 8        Total Surface Area Debrided:  520 sq cm     Estimated Blood Loss:  Estimated amt of blood loss is 10 ml    Hemostasis Achieved: Pressure    Procedural Pain: 4 / 10     Post Procedural Pain: 0 / 10     Response to treatment: Patient tolerated treatment with mild discomfort but relieved after procedure was completed    Compression treatment

## 2022-08-31 ENCOUNTER — HOME CARE VISIT (OUTPATIENT)
Dept: SCHEDULING | Facility: HOME HEALTH | Age: 22
End: 2022-08-31

## 2022-08-31 NOTE — DISCHARGE INSTRUCTIONS
Discharge Instructions from  [address] [facility]  [address] [place], [address]  [phone] Fax [phone]    NAME:  Romana Perez  YOB: 2000  MEDICAL RECORD NUMBER:  [id number]  DATE:  @ED@    Wound Cleansing:   Do not scrub or use excessive force. Cleanse wound prior to applying a clean dressing with:  [] Normal Saline [] Keep Wound Dry in Shower    [x] Wound Cleanser   [] Cleanse wound with Mild Soap & Water  [] May Shower at Discharge   [] Other:      Topical Treatments:  Do not apply lotions, creams, or ointments to wound bed unless directed. [x] Apply moisturizing lotion to skin surrounding the wound prior to dressing change.  [] Apply antifungal ointment to skin surrounding the wound prior to dressing change. [x] Apply thin film of moisture barrier ointment to skin immediately around wound. [] Other:       Dressings:           Wound Location Bilateral lower legs   [x] Apply Primary Dressing:       [] MediHoney Gel [x] Alginate with Silver [] Alginate   [] Collagen [] Collagen with Silver   [] Santyl with Moisten saline gauze     [] Hydrocolloid   [] MediHoney Alginate [] Foam with Silver   [] Foam   [] Hydrofera Blue    [] Mepilex Border    [] Moisten with Saline [] Hydrogel [] Mepitel     [] Bactroban/Mupirocin [] Polysporin  [] Other:    [] Pack wound loosely with  [] Iodoform   [] Plain Packing  [] Other   [x] Cover and Secure with:     [x] Gauze [x] Shyla [] Kerlix   [] Ace Wrap [] Cover Roll Tape [x] ABD     [x] Other: Unna Boot and Coban   Avoid contact of tape with skin.   [x] Change dressing: [] Daily    [] Every Other Day [] Three times per week   [x] Once a week [] Do Not Change Dressing   [] Other:       Edema Control:  Apply: [] Compression Stocking []Right Leg []Left Leg   [] Tubigrip []Right Leg Double Layer []Left Leg Double Layer       []Right Leg Single Layer []Left Leg Single Layer   [] SpandaGrip []Right Leg []Left Leg      []Low compression 5-10 mm/Hg      []Medium compression 10-20 mm/Hg     []High compression  20-30 mm/Hg   every morning immediately when getting up should be applied to affected leg(s) from mid foot to knee making sure to cover the heel. Remove every night before going to bed. [] Elevate leg(s) above the level of the heart when sitting. [x] Avoid prolonged standing in one place. Compression:  Apply: [x] Multilayer Compression Wrap Applied in Clinic [x]RightLeg [x]Left Leg   [] Multi-layer compression. Do not get leg(s) with wrap wet. If wraps become too tight call the center or completely remove the wrap. [x] Elevate leg(s) above the level of the heart when sitting. [x] Avoid prolonged standing in one place. Dietary:  [x] Diet as tolerated: [] Calorie Diabetic Diet: [] No Added Salt:  [] Increase Protein: [] Other:   Activity:  [x] Activity as tolerated:  [x] Patient has no activity restrictions     [] Strict Bedrest: [] Remain off Work:     [] May return to full duty work:                                   [] Return to work with restrictions:             Return Appointment:  [x] Wound and dressing supply provider:   [] ECF or Home Healthcare:  [x] Wound Assessment: [x] Physician or NP scheduled for Wound Assessment:   [x] Return Appointment: 1 Week(s)  [] Ordered tests:      Electronically signed Marjan Mejia LPN on 1/77/6797 at [facility]: Should you experience any significant changes in your wound(s) or have questions about your wound care, please contact the [facility] at [address] [place] [address] 8:00 am - 4:30. If you need help with your wound outside these hours and cannot wait until we are again available, contact your PCP or go to the hospital emergency room. PLEASE NOTE: IF YOU ARE UNABLE TO OBTAIN WOUND SUPPLIES, CONTINUE TO USE THE SUPPLIES YOU HAVE AVAILABLE UNTIL YOU ARE ABLE TO REACH US.  IT IS MOST IMPORTANT TO KEEP THE WOUND COVERED AT ALL TIMES.      Physician Signature:_______________________    Date: ___________ Time:  ____________

## 2022-08-31 NOTE — WOUND CARE
08/30/22 1729   Wound Leg lower Posterior; Left 08/30/22   Date First Assessed/Time First Assessed: 08/30/22 1530   Present on Hospital Admission: Yes  Wound Approximate Age at First Assessment (Weeks): 5 weeks  Primary Wound Type: Venous Ulcer  Location: Leg lower  Wound Location Orientation: Posterior; Left . .. Wound Image     Wound Etiology Venous   Dressing Status Intact;Breakthrough drainage noted;New drainage noted; Old drainage noted   Cleansed Wound cleanser   Dressing/Treatment ABD pad;Alginate with Ag;Cast padding;Coban/self-adherent bandages;Gauze dressing/dressing sponge; Other (Comment)  (Unna Boot and Coban)   Dressing Change Due 09/06/22   Wound Length (cm) 19 cm   Wound Width (cm) 20.5 cm   Wound Depth (cm) 0.1 cm   Wound Surface Area (cm^2) 389.5 cm^2   Wound Volume (cm^3) 38.95 cm^3   Post-Procedure Length (cm) 19.3 cm   Post-Procedure Width (cm) 20.7 cm   Post-Procedure Depth (cm) 0.2 cm   Post-Procedure Surface Area (cm^2) 399.51 cm^2   Post-Procedure Volume (cm^3) 79.902 cm^3   Wound Assessment Denuded;Devitalized tissue;Erythema;Fibrin;Granulation tissue;Pale granulation tissue;Slough   Drainage Amount Copious   Drainage Description Thin;Thick; Serosanguinous   Wound Odor None   Margarette-Wound/Incision Assessment Blanchable erythema   Edges Flat/open edges; Unattached edges   Wound Thickness Description Full thickness   Wound Leg lower Posterior;Right 08/30/22   Date First Assessed/Time First Assessed: 08/30/22 1530   Wound Approximate Age at First Assessment (Weeks): 5 weeks  Primary Wound Type: Venous Ulcer  Location: Leg lower  Wound Location Orientation: Posterior;Right  Date of First Observation: 08/30/22   Wound Image     Wound Etiology Venous   Dressing Status Intact;Breakthrough drainage noted;New drainage noted; Old drainage noted  [employer] Boot and coban)   Cleansed Wound cleanser   Dressing/Treatment ABD pad;Alginate with Ag;Cast padding;Roll gauze   Dressing Change Due 09/06/22   Wound Length (cm) 12.5 cm   Wound Width (cm) 11 cm   Wound Depth (cm) 0.1 cm   Wound Surface Area (cm^2) 137.5 cm^2   Wound Volume (cm^3) 13.75 cm^3   Post-Procedure Length (cm) 12.8 cm   Post-Procedure Width (cm) 11.2 cm   Post-Procedure Depth (cm) 0.2 cm   Post-Procedure Surface Area (cm^2) 143.36 cm^2   Post-Procedure Volume (cm^3) 28.672 cm^3   Wound Assessment Devitalized tissue; Epithelialization   Drainage Amount Copious   Drainage Description Serosanguinous   Wound Odor None;Malodorous/Putrid   Margarette-Wound/Incision Assessment Blanchable erythema   Edges Flat/open edges; Unattached edges   Wound Thickness Description Full thickness

## 2022-09-01 NOTE — CASE COMMUNICATION
I called and LM with patients sister and attempted to contact patient but her VM is full. This was my 3rd attempt and she will be a non-admit.

## 2022-09-06 ENCOUNTER — APPOINTMENT (OUTPATIENT)
Dept: WOUND CARE | Age: 22
End: 2022-09-06
Attending: PODIATRIST

## 2022-09-13 ENCOUNTER — HOSPITAL ENCOUNTER (OUTPATIENT)
Dept: WOUND CARE | Age: 22
Discharge: HOME | End: 2022-09-13
Attending: PODIATRIST

## 2022-09-13 DIAGNOSIS — L03.116 BILATERAL LOWER LEG CELLULITIS: ICD-10-CM

## 2022-09-13 DIAGNOSIS — L03.115 BILATERAL LOWER LEG CELLULITIS: ICD-10-CM

## 2022-09-13 DIAGNOSIS — I89.0 LYMPHEDEMA: Primary | ICD-10-CM

## 2022-09-13 DIAGNOSIS — S81.809A MULTIPLE OPEN WOUNDS OF LOWER LEG, INITIAL ENCOUNTER: ICD-10-CM

## 2022-09-13 PROCEDURE — 11045 DBRDMT SUBQ TISS EACH ADDL: CPT

## 2022-09-13 PROCEDURE — 11042 DBRDMT SUBQ TIS 1ST 20SQCM/<: CPT

## 2022-09-13 RX ORDER — LIDOCAINE HYDROCHLORIDE 20 MG/ML
JELLY TOPICAL ONCE
Status: CANCELLED | OUTPATIENT
Start: 2022-09-13 | End: 2022-09-13

## 2022-09-13 RX ORDER — LIDOCAINE 50 MG/G
OINTMENT TOPICAL ONCE
Status: CANCELLED | OUTPATIENT
Start: 2022-09-13 | End: 2022-09-13

## 2022-09-13 RX ORDER — LIDOCAINE HYDROCHLORIDE 40 MG/ML
SOLUTION TOPICAL ONCE
Status: CANCELLED | OUTPATIENT
Start: 2022-09-13 | End: 2022-09-13

## 2022-09-13 RX ORDER — BACITRACIN ZINC AND POLYMYXIN B SULFATE 500; 1000 [USP'U]/G; [USP'U]/G
OINTMENT TOPICAL ONCE
Status: CANCELLED | OUTPATIENT
Start: 2022-09-13 | End: 2022-09-13

## 2022-09-13 RX ORDER — LIDOCAINE 40 MG/G
CREAM TOPICAL ONCE
Status: CANCELLED | OUTPATIENT
Start: 2022-09-13 | End: 2022-09-13

## 2022-09-13 RX ORDER — MUPIROCIN 20 MG/G
OINTMENT TOPICAL ONCE
Status: CANCELLED | OUTPATIENT
Start: 2022-09-13 | End: 2022-09-13

## 2022-09-13 RX ORDER — CLOBETASOL PROPIONATE 0.5 MG/G
OINTMENT TOPICAL ONCE
Status: CANCELLED | OUTPATIENT
Start: 2022-09-13 | End: 2022-09-13

## 2022-09-13 RX ORDER — LIDOCAINE HYDROCHLORIDE 20 MG/ML
JELLY TOPICAL ONCE
Status: COMPLETED | OUTPATIENT
Start: 2022-09-13 | End: 2022-09-13

## 2022-09-13 RX ADMIN — LIDOCAINE HYDROCHLORIDE: 20 JELLY TOPICAL at 18:00

## 2022-09-14 VITALS
TEMPERATURE: 98.6 F | RESPIRATION RATE: 20 BRPM | SYSTOLIC BLOOD PRESSURE: 140 MMHG | HEART RATE: 120 BPM | OXYGEN SATURATION: 97 % | DIASTOLIC BLOOD PRESSURE: 81 MMHG

## 2022-09-14 NOTE — WOUND CARE
09/13/22 1500   Wound Leg lower Posterior; Left 08/30/22   Date First Assessed/Time First Assessed: 08/30/22 1530   Present on Hospital Admission: Yes  Wound Approximate Age at First Assessment (Weeks): 5 weeks  Primary Wound Type: Venous Ulcer  Location: Leg lower  Wound Location Orientation: Posterior; Left . ..    Wound Image     Wound Etiology Venous   Dressing Status Intact;Breakthrough drainage noted   Cleansed Wound cleanser;Vashe   Dressing/Treatment Alginate with Ag;ABD pad;Roll gauze  (2 layer wrap)   Dressing Change Due 09/20/22   Wound Length (cm) 10 cm   Wound Width (cm) 9 cm   Wound Depth (cm) 0.1 cm   Wound Surface Area (cm^2) 90 cm^2   Change in Wound Size % 76.89   Wound Volume (cm^3) 9 cm^3   Wound Healing % 77   Post-Procedure Length (cm) 10.1 cm   Post-Procedure Width (cm) 9.1 cm   Post-Procedure Depth (cm) 0.1 cm   Post-Procedure Surface Area (cm^2) 91.91 cm^2   Post-Procedure Volume (cm^3) 9.191 cm^3   Wound Assessment Devitalized tissue   Drainage Amount Moderate   Drainage Description Serosanguinous   Wound Odor None   Margarette-Wound/Incision Assessment Blanchable erythema   Edges Flat/open edges   Wound Thickness Description Partial thickness   Wound Leg lower Posterior;Right 08/30/22   Date First Assessed/Time First Assessed: 08/30/22 1530   Wound Approximate Age at First Assessment (Weeks): 5 weeks  Primary Wound Type: Venous Ulcer  Location: Leg lower  Wound Location Orientation: Posterior;Right  Date of First Observation: 08/30/22   Wound Image     Wound Etiology Venous   Dressing Status Intact;Breakthrough drainage noted   Cleansed Wound cleanser;Vashe   Dressing/Treatment Alginate with Ag;ABD pad;Roll gauze  (2 layer wrap)   Dressing Change Due 09/20/22   Wound Length (cm) 14 cm   Wound Width (cm) 10 cm   Wound Depth (cm) 0.1 cm   Wound Surface Area (cm^2) 140 cm^2   Change in Wound Size % -1.82   Wound Volume (cm^3) 14 cm^3   Wound Healing % -2   Post-Procedure Length (cm) 14.1 cm Post-Procedure Width (cm) 10.1 cm   Post-Procedure Depth (cm) 0.2 cm   Post-Procedure Surface Area (cm^2) 142.41 cm^2   Post-Procedure Volume (cm^3) 28.482 cm^3   Wound Assessment Devitalized tissue   Drainage Amount Moderate   Drainage Description Serosanguinous   Wound Odor Mild   Margarette-Wound/Incision Assessment Blanchable erythema   Edges Flat/open edges   Wound Thickness Description Full thickness               Multilayer Compression Wrap   (Not Unna) Below the Knee    NAME:  Mima Serrano  YOB: 2000  MEDICAL RECORD NUMBER:  [id number]  DATE:  9/13/2022    Removed old Multilayer wrap if indicated and wash leg with mild soap/water. Applied moisturizing agent to dry skin as needed. Applied primary and secondary dressing as ordered. Applied multilayered dressing below the knee to right lower leg. Applied multilayered dressing below the knee to left lower leg. Instructed patient/caregiver not to remove dressing and to keep it clean and dry. Instructed patient/caregiver on complications to report to provider, such as pain, numbness in toes, heavy drainage, and slippage of dressing. Instructed patient on purpose of compression dressing and on activity and exercise recommendations.     Response to treatment: Patient tolerated treatment with mild discomfort but relieved after procedure was completed      Electronically signed by Edwin Gross RN on 9/14/2022 at 11:24 AM

## 2022-09-14 NOTE — PROGRESS NOTES
Debridement Wound Care        Problem List Items Addressed This Visit          Other    Bilateral lower leg cellulitis    Relevant Medications    lidocaine (XYLOCAINE) 2 % jelly (Completed)    Other Relevant Orders    INITIATE OUTPATIENT WOUND CARE PROTOCOL    Multiple open wounds of lower leg, initial encounter    Relevant Medications    lidocaine (XYLOCAINE) 2 % jelly (Completed)    Other Relevant Orders    INITIATE OUTPATIENT WOUND CARE PROTOCOL    Lymphedema - Primary    Relevant Medications    lidocaine (XYLOCAINE) 2 % jelly (Completed)    Other Relevant Orders    INITIATE OUTPATIENT WOUND CARE PROTOCOL       Medications Ordered Today   Medications    lidocaine (XYLOCAINE) 2 % jelly        Procedure Note  Indications:  Based on my examination of this patient's wound(s)/ulcer(s) today, debridement is required to promote healing and evaluate the wound base. Performed by: Philip Mendez DPM    Consent obtained: Yes    Time out taken: Yes    Debridement: Excisional    Using  Curette the wound(s)/ulcer(s) was/were sharply debrided down through and including the removal of    epidermis, dermis, and subcutaneous tissue    Devitalized Tissue Debrided: fibrin, biofilm, and slough    Pre Debridement Measurements:  Are located in the Wound/Ulcer Documentation Flow Sheet    Wound/Ulcer #: 1 and 2    Post Debridement Measurements:  Wound/Ulcer Descriptions are Pre Debridement except measurements: Non-Pressure ulcer, fat layer exposed    Wound Calf Left 08/22/22 (Active)   Number of days: 23       Wound Calf Right 08/22/22 (Active)   Number of days: 23       Wound Leg lower Posterior; Left 08/30/22 (Active)   Wound Image    08/30/22 1729   Wound Etiology Venous 08/30/22 1729   Dressing Status Intact;Breakthrough drainage noted;New drainage noted; Old drainage noted 08/30/22 1729   Cleansed Wound cleanser 08/30/22 1729   Dressing/Treatment ABD pad;Alginate with Ag;Cast padding;Coban/self-adherent bandages;Gauze dressing/dressing sponge; Other (Comment) 08/30/22 1729   Dressing Change Due 09/06/22 08/30/22 1729   Wound Length (cm) 19 cm 08/30/22 1729   Wound Width (cm) 20.5 cm 08/30/22 1729   Wound Depth (cm) 0.1 cm 08/30/22 1729   Wound Surface Area (cm^2) 389.5 cm^2 08/30/22 1729   Wound Volume (cm^3) 38.95 cm^3 08/30/22 1729   Post-Procedure Length (cm) 19.3 cm 08/30/22 1729   Post-Procedure Width (cm) 20.7 cm 08/30/22 1729   Post-Procedure Depth (cm) 0.2 cm 08/30/22 1729   Post-Procedure Surface Area (cm^2) 399.51 cm^2 08/30/22 1729   Post-Procedure Volume (cm^3) 79.902 cm^3 08/30/22 1729   Wound Assessment Denuded;Devitalized tissue;Erythema;Fibrin;Granulation tissue;Pale granulation tissue;Slough 08/30/22 1729   Drainage Amount Copious 08/30/22 1729   Drainage Description Thin;Thick; Serosanguinous 08/30/22 1729   Wound Odor None 08/30/22 1729   Margarette-Wound/Incision Assessment Blanchable erythema 08/30/22 1729   Edges Flat/open edges; Unattached edges 08/30/22 1729   Wound Thickness Description Full thickness 08/30/22 1729   Number of days: 15       Wound Leg lower Posterior;Right 08/30/22 (Active)   Wound Image    08/30/22 1729   Wound Etiology Venous 08/30/22 1729   Dressing Status Intact;Breakthrough drainage noted;New drainage noted; Old drainage noted 08/30/22 1729   Cleansed Wound cleanser 08/30/22 1729   Dressing/Treatment ABD pad;Alginate with Ag;Cast padding;Roll gauze 08/30/22 1729   Dressing Change Due 09/06/22 08/30/22 1729   Wound Length (cm) 12.5 cm 08/30/22 1729   Wound Width (cm) 11 cm 08/30/22 1729   Wound Depth (cm) 0.1 cm 08/30/22 1729   Wound Surface Area (cm^2) 137.5 cm^2 08/30/22 1729   Wound Volume (cm^3) 13.75 cm^3 08/30/22 1729   Post-Procedure Length (cm) 12.8 cm 08/30/22 1729   Post-Procedure Width (cm) 11.2 cm 08/30/22 1729   Post-Procedure Depth (cm) 0.2 cm 08/30/22 1729   Post-Procedure Surface Area (cm^2) 143.36 cm^2 08/30/22 1729   Post-Procedure Volume (cm^3) 28.672 cm^3 08/30/22 1729 Wound Assessment Devitalized tissue; Epithelialization 08/30/22 1729   Drainage Amount Copious 08/30/22 1729   Drainage Description Serosanguinous 08/30/22 1729   Wound Odor None;Malodorous/Putrid 08/30/22 1729   Margarette-Wound/Incision Assessment Blanchable erythema 08/30/22 1729   Edges Flat/open edges; Unattached edges 08/30/22 1729   Wound Thickness Description Full thickness 08/30/22 1729   Number of days: 15        Percent of Wound(s)/Ulcer(s) Debrided: 100%    Total Surface Area Debrided:  Approx 230 sq cm See RN's note    Diabetic/Pressure/Non Pressure Ulcers only: Non-Pressure ulcer, fat layer exposed  Ulcer:     Estimated Blood Loss:  Estimated amt of blood loss is 10 ml    Hemostasis Achieved: Pressure    Procedural Pain: 0 / 10     Post Procedural Pain: 0 / 10     Response to treatment: Well tolerated by patient

## 2022-09-20 ENCOUNTER — HOSPITAL ENCOUNTER (OUTPATIENT)
Dept: WOUND CARE | Age: 22
Discharge: HOME | End: 2022-09-20
Attending: PODIATRIST

## 2022-09-20 DIAGNOSIS — I89.0 LYMPHEDEMA: Primary | ICD-10-CM

## 2022-09-20 DIAGNOSIS — L03.116 BILATERAL LOWER LEG CELLULITIS: ICD-10-CM

## 2022-09-20 DIAGNOSIS — S81.809A MULTIPLE OPEN WOUNDS OF LOWER LEG, INITIAL ENCOUNTER: ICD-10-CM

## 2022-09-20 DIAGNOSIS — L03.115 BILATERAL LOWER LEG CELLULITIS: ICD-10-CM

## 2022-09-20 PROCEDURE — 11042 DBRDMT SUBQ TIS 1ST 20SQCM/<: CPT

## 2022-09-20 PROCEDURE — 11045 DBRDMT SUBQ TISS EACH ADDL: CPT

## 2022-09-20 RX ORDER — LIDOCAINE HYDROCHLORIDE 20 MG/ML
JELLY TOPICAL ONCE
Status: CANCELLED | OUTPATIENT
Start: 2022-09-20 | End: 2022-09-20

## 2022-09-20 RX ORDER — MUPIROCIN 20 MG/G
OINTMENT TOPICAL ONCE
Status: CANCELLED | OUTPATIENT
Start: 2022-09-20 | End: 2022-09-20

## 2022-09-20 RX ORDER — BACITRACIN ZINC AND POLYMYXIN B SULFATE 500; 1000 [USP'U]/G; [USP'U]/G
OINTMENT TOPICAL ONCE
Status: CANCELLED | OUTPATIENT
Start: 2022-09-20 | End: 2022-09-20

## 2022-09-20 RX ORDER — LIDOCAINE 50 MG/G
OINTMENT TOPICAL ONCE
Status: CANCELLED | OUTPATIENT
Start: 2022-09-20 | End: 2022-09-20

## 2022-09-20 RX ORDER — LIDOCAINE HYDROCHLORIDE 20 MG/ML
JELLY TOPICAL ONCE
Status: COMPLETED | OUTPATIENT
Start: 2022-09-20 | End: 2022-09-20

## 2022-09-20 RX ORDER — LIDOCAINE 40 MG/G
CREAM TOPICAL ONCE
Status: CANCELLED | OUTPATIENT
Start: 2022-09-20 | End: 2022-09-20

## 2022-09-20 RX ORDER — CLOBETASOL PROPIONATE 0.5 MG/G
OINTMENT TOPICAL ONCE
Status: CANCELLED | OUTPATIENT
Start: 2022-09-20 | End: 2022-09-20

## 2022-09-20 RX ORDER — LIDOCAINE HYDROCHLORIDE 40 MG/ML
SOLUTION TOPICAL ONCE
Status: CANCELLED | OUTPATIENT
Start: 2022-09-20 | End: 2022-09-20

## 2022-09-20 RX ADMIN — LIDOCAINE HYDROCHLORIDE: 20 JELLY TOPICAL at 15:00

## 2022-09-20 NOTE — PROGRESS NOTES
Debridement Wound Care        Problem List Items Addressed This Visit    None      No orders of the defined types were placed in this encounter. Procedure Note  Indications:  Based on my examination of this patient's wound(s)/ulcer(s) today, debridement is required to promote healing and evaluate the wound base. Performed by: Donavan Ortiz DPM    Consent obtained: Yes    Time out taken: Yes    Debridement: Excisional    Using  Curette the wound(s)/ulcer(s) was/were sharply debrided down through and including the removal of    epidermis, dermis, and subcutaneous tissue    Devitalized Tissue Debrided: fibrin, biofilm, and slough    Pre Debridement Measurements:  Are located in the Wound/Ulcer Documentation Flow Sheet    Wound/Ulcer #: 1 and 2    Post Debridement Measurements:  Wound/Ulcer Descriptions are Pre Debridement except measurements: Non-Pressure ulcer, fat layer exposed    Wound Calf Left 08/22/22 (Active)   Number of days: 29       Wound Calf Right 08/22/22 (Active)   Number of days: 29       Wound Leg lower Posterior; Left 08/30/22 (Active)   Wound Image    09/13/22 1500   Wound Etiology Venous 09/13/22 1500   Dressing Status Intact;Breakthrough drainage noted 09/13/22 1500   Cleansed Wound cleanser;Vashe 09/13/22 1500   Dressing/Treatment Alginate with Ag;ABD pad;Roll gauze 09/13/22 1500   Dressing Change Due 09/20/22 09/13/22 1500   Wound Length (cm) 10 cm 09/13/22 1500   Wound Width (cm) 9 cm 09/13/22 1500   Wound Depth (cm) 0.1 cm 09/13/22 1500   Wound Surface Area (cm^2) 90 cm^2 09/13/22 1500   Change in Wound Size % 76.89 09/13/22 1500   Wound Volume (cm^3) 9 cm^3 09/13/22 1500   Wound Healing % 77 09/13/22 1500   Post-Procedure Length (cm) 10.1 cm 09/13/22 1500   Post-Procedure Width (cm) 9.1 cm 09/13/22 1500   Post-Procedure Depth (cm) 0.1 cm 09/13/22 1500   Post-Procedure Surface Area (cm^2) 91.91 cm^2 09/13/22 1500   Post-Procedure Volume (cm^3) 9.191 cm^3 09/13/22 1500   Wound Assessment Devitalized tissue 09/13/22 1500   Drainage Amount Moderate 09/13/22 1500   Drainage Description Serosanguinous 09/13/22 1500   Wound Odor None 09/13/22 1500   Margarette-Wound/Incision Assessment Blanchable erythema 09/13/22 1500   Edges Flat/open edges 09/13/22 1500   Wound Thickness Description Partial thickness 09/13/22 1500   Number of days: 21       Wound Leg lower Posterior;Right 08/30/22 (Active)   Wound Image    09/13/22 1500   Wound Etiology Venous 09/13/22 1500   Dressing Status Intact;Breakthrough drainage noted 09/13/22 1500   Cleansed Wound cleanser;Vashe 09/13/22 1500   Dressing/Treatment Alginate with Ag;ABD pad;Roll gauze 09/13/22 1500   Dressing Change Due 09/20/22 09/13/22 1500   Wound Length (cm) 14 cm 09/13/22 1500   Wound Width (cm) 10 cm 09/13/22 1500   Wound Depth (cm) 0.1 cm 09/13/22 1500   Wound Surface Area (cm^2) 140 cm^2 09/13/22 1500   Change in Wound Size % -1.82 09/13/22 1500   Wound Volume (cm^3) 14 cm^3 09/13/22 1500   Wound Healing % -2 09/13/22 1500   Post-Procedure Length (cm) 14.1 cm 09/13/22 1500   Post-Procedure Width (cm) 10.1 cm 09/13/22 1500   Post-Procedure Depth (cm) 0.2 cm 09/13/22 1500   Post-Procedure Surface Area (cm^2) 142.41 cm^2 09/13/22 1500   Post-Procedure Volume (cm^3) 28.482 cm^3 09/13/22 1500   Wound Assessment Devitalized tissue 09/13/22 1500   Drainage Amount Moderate 09/13/22 1500   Drainage Description Serosanguinous 09/13/22 1500   Wound Odor Mild 09/13/22 1500   Margarette-Wound/Incision Assessment Blanchable erythema 09/13/22 1500   Edges Flat/open edges 09/13/22 1500   Wound Thickness Description Full thickness 09/13/22 1500   Number of days: 21        Percent of Wound(s)/Ulcer(s) Debrided: 100%    Total Surface Area Debrided:  Approx 140 sq cm See RN's note    Diabetic/Pressure/Non Pressure Ulcers only: Non-Pressure ulcer, fat layer exposed  Ulcer:     Estimated Blood Loss:  Estimated amt of blood loss is 10 ml    Hemostasis Achieved: Pressure    Procedural Pain: 0 / 10     Post Procedural Pain: 0 / 10     Response to treatment: Well tolerated by patient

## 2022-09-22 VITALS — RESPIRATION RATE: 20 BRPM

## 2022-09-22 NOTE — DISCHARGE INSTRUCTIONS
Discharge Instructions from  [address] [facility]  [address] [place], [facility]  [phone] Fax [phone]    NAME:  Pepe Eubanks  YOB: 2000  MEDICAL RECORD NUMBER:  [id number]  DATE:  9/20/2022    Wound Cleansing:   Do not scrub or use excessive force. Cleanse wound prior to applying a clean dressing with:  [] Normal Saline [] Keep Wound Dry in Shower    [] Wound Cleanser   [] Cleanse wound with Mild Soap & Water  [] May Shower at Discharge   [] Other:      Topical Treatments:  Do not apply lotions, creams, or ointments to wound bed unless directed. [] Apply moisturizing lotion to skin surrounding the wound prior to dressing change.  [] Apply antifungal ointment to skin surrounding the wound prior to dressing change.  [] Apply thin film of moisture barrier ointment to skin immediately around wound. [] Other:       Dressings:           Wound Location: Bilateral Legs   [] Apply Primary Dressing:       [] MediHoney Gel [] Alginate with Silver [] Alginate   [] Collagen [] Collagen with Silver   [] Santyl with Moisten saline gauze     [] Hydrocolloid   [] MediHoney Alginate [] Foam with Silver   [] Foam   [] Hydrofera Blue    [] Mepilex Border    [] Moisten with Saline [] Hydrogel [] Mepitel     [] Bactroban/Mupirocin [] Polysporin  [] Other:    [] Pack wound loosely with  [] Iodoform   [] Plain Packing  [] Other   [] Cover and Secure with:     [] Gauze [] Shyla [] Kerlix   [] Ace Wrap [] Cover Roll Tape [] ABD     [] Other:    Avoid contact of tape with skin.   [] Change dressing: [] Daily    [] Every Other Day [] Three times per week   [] Once a week [x] Do Not Change Dressing   [] Other:      Edema Control:  Apply: [] Compression Stocking []Right Leg []Left Leg   [] Tubigrip []Right Leg Double Layer []Left Leg Double Layer       []Right Leg Single Layer []Left Leg Single Layer   [] SpandaGrip []Right Leg  []Left Leg      []Low compression 5-10 mm/Hg      []Medium compression 10-20 mm/Hg     []High compression  20-30 mm/Hg   every morning immediately when getting up should be applied to affected leg(s) from mid foot to knee making sure to cover the heel. Remove every night before going to bed. [x] Elevate leg(s) above the level of the heart when sitting. [x] Avoid prolonged standing in one place. Compression:  Apply: [x] Multilayer Compression Wrap Applied in Clinic [x]RightLeg [x]Left Leg   [x] Multi-layer compression. Do not get leg(s) with wrap wet. If wraps become too tight call the center or completely remove the wrap. [x] Elevate leg(s) above the level of the heart when sitting. [x] Avoid prolonged standing in one place. Dietary:  [x] Diet as tolerated: [] Calorie Diabetic Diet: [] No Added Salt:  [] Increase Protein: [] Other:         Activity:  [x] Activity as tolerated:  [] Patient has no activity restrictions     [] Strict Bedrest: [] Remain off Work:     [] May return to full duty work:                                   [] Return to work with restrictions:             Return Appointment:  [] Wound and dressing supply provider:   [] ECF or Home Healthcare:  [] Wound Assessment: [] Physician or NP scheduled for Wound Assessment:   [x] Return Appointment: With Dr. Marlyn Almanza  in  [address] [place] [address])  [] Ordered tests:      Electronically signed Namita Cuevas RN on 9/20/2022 at [address] [place] [address] Se: Should you experience any significant changes in your wound(s) or have questions about your wound care, please contact the [address] [place] [address] at [address] [place] [address] 8:00 am - 4:30. If you need help with your wound outside these hours and cannot wait until we are again available, contact your PCP or go to the hospital emergency room. PLEASE NOTE: IF YOU ARE UNABLE TO OBTAIN WOUND SUPPLIES, CONTINUE TO USE THE SUPPLIES YOU HAVE AVAILABLE UNTIL YOU ARE ABLE TO REACH US.  IT IS MOST IMPORTANT TO KEEP THE WOUND COVERED AT ALL TIMES.      Physician Signature:_______________________    Date: ___________ Time:  ____________

## 2022-09-22 NOTE — WOUND CARE
09/20/22 1630   Wound Leg lower Posterior; Left 08/30/22   Date First Assessed/Time First Assessed: 08/30/22 1530   Present on Hospital Admission: Yes  Wound Approximate Age at First Assessment (Weeks): 5 weeks  Primary Wound Type: Venous Ulcer  Location: Leg lower  Wound Location Orientation: Posterior; Left . .. Wound Image     Wound Etiology Venous   Dressing Status Breakthrough drainage noted   Cleansed Wound cleanser; Other (Comment)   Dressing/Treatment Alginate with Ag;Dry dressing;Roll gauze;Coban/self-adherent bandages  (calamine unna boot)   Dressing Change Due 09/27/22   Wound Length (cm) 9 cm   Wound Width (cm) 9 cm   Wound Depth (cm) 0.1 cm   Wound Surface Area (cm^2) 81 cm^2   Change in Wound Size % 79.2   Wound Volume (cm^3) 8.1 cm^3   Wound Healing % 79   Post-Procedure Length (cm) 9.1 cm   Post-Procedure Width (cm) 9.1 cm   Post-Procedure Depth (cm) 0.2 cm   Post-Procedure Surface Area (cm^2) 82.81 cm^2   Post-Procedure Volume (cm^3) 16.562 cm^3   Wound Assessment Devitalized tissue   Drainage Amount Moderate   Drainage Description Serosanguinous   Wound Odor None   Margarette-Wound/Incision Assessment Blanchable erythema   Edges Defined edges   Wound Thickness Description Full thickness   Wound Leg lower Posterior;Right 08/30/22   Date First Assessed/Time First Assessed: 08/30/22 1530   Wound Approximate Age at First Assessment (Weeks): 5 weeks  Primary Wound Type: Venous Ulcer  Location: Leg lower  Wound Location Orientation: Posterior;Right  Date of First Observation: 08/30/22   Wound Image     Wound Etiology Venous   Dressing Status Breakthrough drainage noted   Cleansed Wound cleanser   Dressing/Treatment Alginate with Ag;Dry dressing;Roll gauze;Coban/self-adherent bandages  (calamine unna boot)   Dressing Change Due 09/27/22   Wound Length (cm) 12.5 cm   Wound Width (cm) 4.7 cm   Wound Depth (cm) 0.2 cm   Wound Surface Area (cm^2) 58.75 cm^2   Change in Wound Size % 57.27   Wound Volume (cm^3) 11.75 cm^3   Wound Healing % 15   Post-Procedure Length (cm) 12.6 cm   Post-Procedure Width (cm) 4.8 cm   Post-Procedure Depth (cm) 0.3 cm   Post-Procedure Surface Area (cm^2) 60.48 cm^2   Post-Procedure Volume (cm^3) 18.144 cm^3   Wound Assessment Devitalized tissue   Drainage Amount Moderate   Drainage Description Serosanguinous   Wound Odor Mild   Margarette-Wound/Incision Assessment Blanchable erythema   Edges Defined edges   Wound Thickness Description Full thickness     Unna Boot Application   Below Knee    NAME:  Jo Palma  YOB: 2000  MEDICAL RECORD NUMBER:  [id number]  DATE:  9/20/2022    Remove old Unna Boot or Boots. Applied moisturizing agent to dry skin as needed. Appied primary and secondary dressing as ordered. Applied Unna roll from toes to knee overlapping each time. Applied ace wrap or coban from toes to below the knee. Secured with tape and/or metal clips covered with tape. Instructed patient/caregiver to keep dressing dry and intact. DO NOT REMOVE DRESSING. Instructed pt/family/caregiver to report excessive draining, loose bandage, wet dressing, severe pain or tingling in toes. Applied [employer] dressing below the knee to bilateral lower legs. Unna Boot(s) were applied per  Guidelines.     Response to treatment: Well tolerated by patient      Electronically signed by Namita Cuevas RN on 9/20/2022 at 4:30 PM

## 2022-09-27 ENCOUNTER — HOSPITAL ENCOUNTER (OUTPATIENT)
Dept: WOUND CARE | Age: 22
Discharge: HOME | End: 2022-09-27
Attending: PODIATRIST

## 2022-09-27 VITALS
TEMPERATURE: 98.6 F | OXYGEN SATURATION: 100 % | DIASTOLIC BLOOD PRESSURE: 80 MMHG | HEART RATE: 95 BPM | SYSTOLIC BLOOD PRESSURE: 140 MMHG | RESPIRATION RATE: 18 BRPM

## 2022-09-27 DIAGNOSIS — I89.0 LYMPHEDEMA: Primary | ICD-10-CM

## 2022-09-27 DIAGNOSIS — L03.115 BILATERAL LOWER LEG CELLULITIS: ICD-10-CM

## 2022-09-27 DIAGNOSIS — S81.809A MULTIPLE OPEN WOUNDS OF LOWER LEG, INITIAL ENCOUNTER: ICD-10-CM

## 2022-09-27 DIAGNOSIS — L03.116 BILATERAL LOWER LEG CELLULITIS: ICD-10-CM

## 2022-09-27 PROCEDURE — 11045 DBRDMT SUBQ TISS EACH ADDL: CPT

## 2022-09-27 PROCEDURE — 11042 DBRDMT SUBQ TIS 1ST 20SQCM/<: CPT

## 2022-09-27 RX ORDER — LIDOCAINE HYDROCHLORIDE 20 MG/ML
JELLY TOPICAL ONCE
Status: CANCELLED | OUTPATIENT
Start: 2022-09-27 | End: 2022-09-27

## 2022-09-27 RX ORDER — CLOBETASOL PROPIONATE 0.5 MG/G
OINTMENT TOPICAL ONCE
Status: CANCELLED | OUTPATIENT
Start: 2022-09-27 | End: 2022-09-27

## 2022-09-27 RX ORDER — MUPIROCIN 20 MG/G
OINTMENT TOPICAL ONCE
Status: CANCELLED | OUTPATIENT
Start: 2022-09-27 | End: 2022-09-27

## 2022-09-27 RX ORDER — LIDOCAINE 40 MG/G
CREAM TOPICAL ONCE
Status: CANCELLED | OUTPATIENT
Start: 2022-09-27 | End: 2022-09-27

## 2022-09-27 RX ORDER — BACITRACIN ZINC AND POLYMYXIN B SULFATE 500; 1000 [USP'U]/G; [USP'U]/G
OINTMENT TOPICAL ONCE
Status: CANCELLED | OUTPATIENT
Start: 2022-09-27 | End: 2022-09-27

## 2022-09-27 RX ORDER — LIDOCAINE HYDROCHLORIDE 40 MG/ML
SOLUTION TOPICAL ONCE
Status: CANCELLED | OUTPATIENT
Start: 2022-09-27 | End: 2022-09-27

## 2022-09-27 RX ORDER — CEPHALEXIN 500 MG/1
500 CAPSULE ORAL 4 TIMES DAILY
Qty: 40 CAPSULE | Refills: 0 | Status: SHIPPED | OUTPATIENT
Start: 2022-09-27 | End: 2022-10-07

## 2022-09-27 RX ORDER — LIDOCAINE 50 MG/G
OINTMENT TOPICAL ONCE
Status: CANCELLED | OUTPATIENT
Start: 2022-09-27 | End: 2022-09-27

## 2022-09-27 RX ORDER — LIDOCAINE HYDROCHLORIDE 20 MG/ML
JELLY TOPICAL ONCE
Status: DISCONTINUED | OUTPATIENT
Start: 2022-09-27 | End: 2022-09-28 | Stop reason: HOSPADM

## 2022-09-27 NOTE — PROGRESS NOTES
Debridement Wound Care        Problem List Items Addressed This Visit    None      Medications Ordered Today   Medications    cephALEXin (KEFLEX) 500 mg capsule     Sig: Take 1 Capsule by mouth four (4) times daily for 10 days. Indications: an infection of the skin and the tissue below the skin     Dispense:  40 Capsule     Refill:  0        Procedure Note  Indications:  Based on my examination of this patient's wound(s)/ulcer(s) today, debridement is required to promote healing and evaluate the wound base. Performed by: Jarad Lopez DPM    Consent obtained: Yes    Time out taken: Yes    Debridement: Excisional    Using  Curette the wound(s)/ulcer(s) was/were sharply debrided down through and including the removal of    epidermis, dermis, and subcutaneous tissue    Devitalized Tissue Debrided: fibrin, biofilm, and slough    Pre Debridement Measurements:  Are located in the Wound/Ulcer Documentation Flow Sheet    Wound/Ulcer #: 1, 2, and 3    Post Debridement Measurements:  Wound/Ulcer Descriptions are Pre Debridement except measurements: Non-Pressure ulcer, fat layer exposed    Wound Calf Left 08/22/22 (Active)   Number of days: 36       Wound Calf Right 08/22/22 (Active)   Number of days: 36       Wound Leg lower Posterior; Left 08/30/22 (Active)   Wound Image    09/20/22 1630   Wound Etiology Venous 09/20/22 1630   Dressing Status Breakthrough drainage noted 09/20/22 1630   Cleansed Wound cleanser; Other (Comment) 09/20/22 1630   Dressing/Treatment Alginate with Ag;Dry dressing;Roll gauze;Coban/self-adherent bandages 09/20/22 1630   Dressing Change Due 09/27/22 09/20/22 1630   Wound Length (cm) 9 cm 09/20/22 1630   Wound Width (cm) 9 cm 09/20/22 1630   Wound Depth (cm) 0.1 cm 09/20/22 1630   Wound Surface Area (cm^2) 81 cm^2 09/20/22 1630   Change in Wound Size % 79.2 09/20/22 1630   Wound Volume (cm^3) 8.1 cm^3 09/20/22 1630   Wound Healing % 79 09/20/22 1630   Post-Procedure Length (cm) 9.1 cm 09/20/22 1630   Post-Procedure Width (cm) 9.1 cm 09/20/22 1630   Post-Procedure Depth (cm) 0.2 cm 09/20/22 1630   Post-Procedure Surface Area (cm^2) 82.81 cm^2 09/20/22 1630   Post-Procedure Volume (cm^3) 16.562 cm^3 09/20/22 1630   Wound Assessment Devitalized tissue 09/20/22 1630   Drainage Amount Moderate 09/20/22 1630   Drainage Description Serosanguinous 09/20/22 1630   Wound Odor None 09/20/22 1630   Margarette-Wound/Incision Assessment Blanchable erythema 09/20/22 1630   Edges Defined edges 09/20/22 1630   Wound Thickness Description Full thickness 09/20/22 1630   Number of days: 28       Wound Leg lower Posterior;Right 08/30/22 (Active)   Wound Image    09/20/22 1630   Wound Etiology Venous 09/20/22 1630   Dressing Status Breakthrough drainage noted 09/20/22 1630   Cleansed Wound cleanser 09/20/22 1630   Dressing/Treatment Alginate with Ag;Dry dressing;Roll gauze;Coban/self-adherent bandages 09/20/22 1630   Dressing Change Due 09/27/22 09/20/22 1630   Wound Length (cm) 12.5 cm 09/20/22 1630   Wound Width (cm) 4.7 cm 09/20/22 1630   Wound Depth (cm) 0.2 cm 09/20/22 1630   Wound Surface Area (cm^2) 58.75 cm^2 09/20/22 1630   Change in Wound Size % 57.27 09/20/22 1630   Wound Volume (cm^3) 11.75 cm^3 09/20/22 1630   Wound Healing % 15 09/20/22 1630   Post-Procedure Length (cm) 12.6 cm 09/20/22 1630   Post-Procedure Width (cm) 4.8 cm 09/20/22 1630   Post-Procedure Depth (cm) 0.3 cm 09/20/22 1630   Post-Procedure Surface Area (cm^2) 60.48 cm^2 09/20/22 1630   Post-Procedure Volume (cm^3) 18.144 cm^3 09/20/22 1630   Wound Assessment Devitalized tissue 09/20/22 1630   Drainage Amount Moderate 09/20/22 1630   Drainage Description Serosanguinous 09/20/22 1630   Wound Odor Mild 09/20/22 1630   Margarette-Wound/Incision Assessment Blanchable erythema 09/20/22 1630   Edges Defined edges 09/20/22 1630   Wound Thickness Description Full thickness 09/20/22 1630   Number of days: 28        Percent of Wound(s)/Ulcer(s) Debrided: 100%    Total Surface Area Debrided:  Approx 44 sq cm See RN's note    Diabetic/Pressure/Non Pressure Ulcers only: Non-Pressure ulcer, fat layer exposed  Ulcer:     Estimated Blood Loss:  Minimal     Hemostasis Achieved: Pressure    Procedural Pain: 0 / 10     Post Procedural Pain: 0 / 10     Response to treatment: Well tolerated by patient

## 2022-10-04 ENCOUNTER — APPOINTMENT (OUTPATIENT)
Dept: WOUND CARE | Age: 22
End: 2022-10-04
Attending: PODIATRIST

## 2022-10-07 NOTE — WOUND CARE
09/27/22 1445   Wound Calf Right;Medial   Date First Assessed/Time First Assessed: 09/27/22 1445   Primary Wound Type: Venous Ulcer  Location: Calf  Wound Location Orientation: Right;Medial   Wound Image     Wound Etiology Venous   Dressing Status   (no dressing)   Cleansed Soap and water; Wound cleanser   Dressing/Treatment Foam   Wound Length (cm) 3 cm   Wound Width (cm) 6.5 cm   Wound Depth (cm) 0.1 cm   Wound Surface Area (cm^2) 19.5 cm^2   Wound Volume (cm^3) 1.95 cm^3   Post-Procedure Length (cm) 3 cm   Post-Procedure Width (cm) 6.5 cm   Post-Procedure Depth (cm) 0.2 cm   Post-Procedure Surface Area (cm^2) 19.5 cm^2   Post-Procedure Volume (cm^3) 3.9 cm^3   Wound Assessment Devitalized tissue   Drainage Amount Scant   Drainage Description Serosanguinous   Wound Odor None   Margarette-Wound/Incision Assessment Blanchable erythema;Edematous   Edges Defined edges   Wound Thickness Description Full thickness   Wound Leg lower Posterior; Left 08/30/22   Date First Assessed/Time First Assessed: 08/30/22 1530   Present on Hospital Admission: Yes  Wound Approximate Age at First Assessment (Weeks): 5 weeks  Primary Wound Type: Venous Ulcer  Location: Leg lower  Wound Location Orientation: Posterior; Left . .. Wound Image     Wound Etiology Venous   Dressing Status New drainage noted; Old drainage noted   Cleansed Wound cleanser; Soap and water   Dressing/Treatment Alginate;Dry dressing;Roll gauze  (Absorbant dressing, 2 layer compression)   Wound Length (cm) 1.5 cm   Wound Width (cm) 1.5 cm   Wound Depth (cm) 0.1 cm   Wound Surface Area (cm^2) 2.25 cm^2   Change in Wound Size % 99.42   Wound Volume (cm^3) 0.225 cm^3   Wound Healing % 99   Post-Procedure Length (cm) 1.6 cm   Post-Procedure Width (cm) 1.6 cm   Post-Procedure Depth (cm) 0.2 cm   Post-Procedure Surface Area (cm^2) 2.56 cm^2   Post-Procedure Volume (cm^3) 0.512 cm^3   Wound Assessment Devitalized tissue;Granulation tissue   Drainage Amount Small   Drainage Description Serosanguinous   Wound Odor None   Margarette-Wound/Incision Assessment Edematous   Edges Defined edges   Wound Thickness Description Full thickness   Wound Leg lower Posterior;Right 08/30/22   Date First Assessed/Time First Assessed: 08/30/22 1530   Wound Approximate Age at First Assessment (Weeks): 5 weeks  Primary Wound Type: Venous Ulcer  Location: Leg lower  Wound Location Orientation: Posterior;Right  Date of First Observation: 08/30/22   Wound Image     Wound Etiology Venous   Dressing Status Old drainage noted;New drainage noted   Cleansed Wound cleanser; Soap and water   Dressing/Treatment Alginate with Ag;Dry dressing;Roll gauze  (absorbant dressing and multilayer)   Wound Length (cm) 11 cm   Wound Width (cm) 2 cm   Wound Depth (cm) 0.1 cm   Wound Surface Area (cm^2) 22 cm^2   Change in Wound Size % 84   Wound Volume (cm^3) 2.2 cm^3   Wound Healing % 84   Post-Procedure Length (cm) 11.2 cm   Post-Procedure Width (cm) 2.2 cm   Post-Procedure Depth (cm) 0.2 cm   Post-Procedure Surface Area (cm^2) 24.64 cm^2   Post-Procedure Volume (cm^3) 4.928 cm^3   Wound Assessment Devitalized tissue   Drainage Amount Small   Drainage Description Serosanguinous   Wound Odor None   Margarette-Wound/Incision Assessment Edematous; Intact   Edges Defined edges   Wound Thickness Description Full thickness     Multilayer Compression Wrap   (Not Unna) Below the Knee    NAME:  Mamta Rutledge  YOB: 2000  MEDICAL RECORD NUMBER:  [id number]  DATE:  9/27/2022    Removed old Multilayer wrap if indicated and wash leg with mild soap/water. Applied moisturizing agent to dry skin as needed. Applied primary and secondary dressing as ordered. Applied multilayered dressing below the knee to right lower leg. Applied multilayered dressing below the knee to left lower leg. Instructed patient/caregiver not to remove dressing and to keep it clean and dry.    Instructed patient/caregiver on complications to report to provider, such as pain, numbness in toes, heavy drainage, and slippage of dressing.     Response to treatment: Well tolerated by patient       Electronically signed by Tono Etienne RN on 10/7/2022 at 10:59 AM

## 2022-10-11 ENCOUNTER — HOSPITAL ENCOUNTER (OUTPATIENT)
Dept: WOUND CARE | Age: 22
Discharge: HOME | End: 2022-10-11
Attending: PODIATRIST

## 2022-10-11 DIAGNOSIS — I89.0 LYMPHEDEMA: Primary | ICD-10-CM

## 2022-10-11 DIAGNOSIS — L03.115 BILATERAL LOWER LEG CELLULITIS: ICD-10-CM

## 2022-10-11 DIAGNOSIS — S81.809A MULTIPLE OPEN WOUNDS OF LOWER LEG, INITIAL ENCOUNTER: ICD-10-CM

## 2022-10-11 DIAGNOSIS — L03.116 BILATERAL LOWER LEG CELLULITIS: ICD-10-CM

## 2022-10-11 PROCEDURE — 11042 DBRDMT SUBQ TIS 1ST 20SQCM/<: CPT

## 2022-10-11 RX ORDER — LIDOCAINE 40 MG/G
CREAM TOPICAL ONCE
Status: CANCELLED | OUTPATIENT
Start: 2022-10-11 | End: 2022-10-11

## 2022-10-11 RX ORDER — LIDOCAINE HYDROCHLORIDE 20 MG/ML
JELLY TOPICAL ONCE
Status: COMPLETED | OUTPATIENT
Start: 2022-10-11 | End: 2022-10-11

## 2022-10-11 RX ORDER — LIDOCAINE HYDROCHLORIDE 20 MG/ML
JELLY TOPICAL ONCE
Status: CANCELLED | OUTPATIENT
Start: 2022-10-11 | End: 2022-10-11

## 2022-10-11 RX ORDER — MUPIROCIN 20 MG/G
OINTMENT TOPICAL ONCE
Status: CANCELLED | OUTPATIENT
Start: 2022-10-11 | End: 2022-10-11

## 2022-10-11 RX ORDER — LIDOCAINE 50 MG/G
OINTMENT TOPICAL ONCE
Status: CANCELLED | OUTPATIENT
Start: 2022-10-11 | End: 2022-10-11

## 2022-10-11 RX ORDER — LIDOCAINE HYDROCHLORIDE 40 MG/ML
SOLUTION TOPICAL ONCE
Status: CANCELLED | OUTPATIENT
Start: 2022-10-11 | End: 2022-10-11

## 2022-10-11 RX ORDER — BACITRACIN ZINC AND POLYMYXIN B SULFATE 500; 1000 [USP'U]/G; [USP'U]/G
OINTMENT TOPICAL ONCE
Status: CANCELLED | OUTPATIENT
Start: 2022-10-11 | End: 2022-10-11

## 2022-10-11 RX ORDER — CLOBETASOL PROPIONATE 0.5 MG/G
OINTMENT TOPICAL ONCE
Status: CANCELLED | OUTPATIENT
Start: 2022-10-11 | End: 2022-10-11

## 2022-10-11 RX ADMIN — LIDOCAINE HYDROCHLORIDE: 20 JELLY TOPICAL at 17:00

## 2022-10-12 VITALS
HEART RATE: 115 BPM | OXYGEN SATURATION: 99 % | RESPIRATION RATE: 18 BRPM | DIASTOLIC BLOOD PRESSURE: 91 MMHG | SYSTOLIC BLOOD PRESSURE: 148 MMHG | TEMPERATURE: 98.2 F

## 2022-10-12 NOTE — PROGRESS NOTES
Debridement Wound Care        Problem List Items Addressed This Visit          Other    Bilateral lower leg cellulitis    Relevant Medications    lidocaine (URO-JET/ GLYDO) 2 % jelly (Completed)    Other Relevant Orders    INITIATE OUTPATIENT WOUND CARE PROTOCOL    Multiple open wounds of lower leg, initial encounter    Relevant Medications    lidocaine (URO-JET/ GLYDO) 2 % jelly (Completed)    Other Relevant Orders    INITIATE OUTPATIENT WOUND CARE PROTOCOL    Lymphedema - Primary    Relevant Medications    lidocaine (URO-JET/ GLYDO) 2 % jelly (Completed)    Other Relevant Orders    INITIATE OUTPATIENT WOUND CARE PROTOCOL       Medications Ordered Today   Medications    lidocaine (URO-JET/ GLYDO) 2 % jelly        Procedure Note  Indications:  Based on my examination of this patient's wound(s)/ulcer(s) today, debridement is required to promote healing and evaluate the wound base. Performed by: Regino Landers DPM    Consent obtained: Yes    Time out taken: Yes    Debridement: Excisional    Using  Curette the wound(s)/ulcer(s) was/were sharply debrided down through and including the removal of    epidermis, dermis, and subcutaneous tissue    Devitalized Tissue Debrided: fibrin, biofilm, and slough    Pre Debridement Measurements:  Are located in the Wound/Ulcer Documentation Flow Sheet    Wound/Ulcer #: 1 and 2    Post Debridement Measurements:  Wound/Ulcer Descriptions are Pre Debridement except measurements: Non-Pressure ulcer, fat layer exposed    Wound Calf Left 08/22/22 (Active)   Number of days: 51       Wound Calf Right 08/22/22 (Active)   Number of days: 51       Wound Leg lower Posterior; Left 08/30/22 (Active)   Wound Image   10/11/22 1500   Wound Etiology Venous 10/11/22 1500   Dressing Status Intact 10/11/22 1500   Cleansed Wound cleanser; Soap and water 09/27/22 1445   Dressing/Treatment Alginate;Dry dressing;Roll gauze 09/27/22 1445   Dressing Change Due 09/27/22 09/20/22 1630   Wound Length (cm) 1.5 cm 09/27/22 1445   Wound Width (cm) 1.5 cm 09/27/22 1445   Wound Depth (cm) 0.1 cm 09/27/22 1445   Wound Surface Area (cm^2) 2.25 cm^2 09/27/22 1445   Change in Wound Size % 99.42 09/27/22 1445   Wound Volume (cm^3) 0.225 cm^3 09/27/22 1445   Wound Healing % 99 09/27/22 1445   Post-Procedure Length (cm) 1.6 cm 09/27/22 1445   Post-Procedure Width (cm) 1.6 cm 09/27/22 1445   Post-Procedure Depth (cm) 0.2 cm 09/27/22 1445   Post-Procedure Surface Area (cm^2) 2.56 cm^2 09/27/22 1445   Post-Procedure Volume (cm^3) 0.512 cm^3 09/27/22 1445   Wound Assessment Devitalized tissue;Granulation tissue 09/27/22 1445   Drainage Amount Small 09/27/22 1445   Drainage Description Serosanguinous 09/27/22 1445   Wound Odor None 09/27/22 1445   Margarette-Wound/Incision Assessment Edematous 09/27/22 1445   Edges Defined edges 09/27/22 1445   Wound Thickness Description Full thickness 09/27/22 1445   Number of days: 43       Wound Leg lower Posterior;Right 08/30/22 (Active)   Wound Image   10/11/22 1500   Wound Etiology Venous 10/11/22 1500   Dressing Status Old drainage noted;New drainage noted 09/27/22 1445   Cleansed Wound cleanser;Vashe 10/11/22 1500   Dressing/Treatment Alginate with Ag 10/11/22 1500   Dressing Change Due 10/18/22 10/11/22 1500   Wound Length (cm) 6 cm 10/11/22 1500   Wound Width (cm) 6 cm 10/11/22 1500   Wound Depth (cm) 0.1 cm 10/11/22 1500   Wound Surface Area (cm^2) 36 cm^2 10/11/22 1500   Change in Wound Size % 73.82 10/11/22 1500   Wound Volume (cm^3) 3.6 cm^3 10/11/22 1500   Wound Healing % 74 10/11/22 1500   Post-Procedure Length (cm) 11.2 cm 09/27/22 1445   Post-Procedure Width (cm) 2.2 cm 09/27/22 1445   Post-Procedure Depth (cm) 0.2 cm 09/27/22 1445   Post-Procedure Surface Area (cm^2) 24.64 cm^2 09/27/22 1445   Post-Procedure Volume (cm^3) 4.928 cm^3 09/27/22 1445   Wound Assessment Devitalized tissue 10/11/22 1500   Drainage Amount Scant 10/11/22 1500   Drainage Description Serous 10/11/22 1500   Wound Odor Mild 10/11/22 1500   Margarette-Wound/Incision Assessment Edematous 10/11/22 1500   Edges Flat/open edges 10/11/22 1500   Wound Thickness Description Full thickness 10/11/22 1500   Number of days: 43       Wound Calf Right;Medial (Active)   Wound Image    09/27/22 1445   Wound Etiology Other (Comment) 10/11/22 1500   Dressing Status Intact 10/11/22 1500   Cleansed Wound cleanser;Vashe 10/11/22 1500   Dressing/Treatment Foam 10/11/22 1500   Wound Length (cm) 3 cm 10/11/22 1500   Wound Width (cm) 5 cm 10/11/22 1500   Wound Depth (cm) 0.1 cm 10/11/22 1500   Wound Surface Area (cm^2) 15 cm^2 10/11/22 1500   Change in Wound Size % 23.08 10/11/22 1500   Wound Volume (cm^3) 1.5 cm^3 10/11/22 1500   Wound Healing % 23 10/11/22 1500   Post-Procedure Length (cm) 3 cm 09/27/22 1445   Post-Procedure Width (cm) 6.5 cm 09/27/22 1445   Post-Procedure Depth (cm) 0.2 cm 09/27/22 1445   Post-Procedure Surface Area (cm^2) 19.5 cm^2 09/27/22 1445   Post-Procedure Volume (cm^3) 3.9 cm^3 09/27/22 1445   Wound Assessment Superficial 10/11/22 1500   Drainage Amount Scant 10/11/22 1500   Drainage Description Serous 10/11/22 1500   Wound Odor None 10/11/22 1500   Margarette-Wound/Incision Assessment Blanchable erythema; Excoriated 10/11/22 1500   Edges Flat/open edges 10/11/22 1500   Wound Thickness Description Partial thickness 10/11/22 1500   Number of days: 15        Percent of Wound(s)/Ulcer(s) Debrided: 100%    Total Surface Area Debrided:  Approx 12 sq cm See RN's note    Diabetic/Pressure/Non Pressure Ulcers only: Non-Pressure ulcer, fat layer exposed  Ulcer:     Estimated Blood Loss:  Estimated amt of blood loss is 10 ml    Hemostasis Achieved: Pressure    Procedural Pain: 0 / 10     Post Procedural Pain: 0 / 10     Response to treatment: Well tolerated by patient

## 2022-10-12 NOTE — WOUND CARE
10/11/22 1500   Wound Calf Right;Medial   Date First Assessed/Time First Assessed: 09/27/22 1445   Primary Wound Type: Venous Ulcer  Location: Calf  Wound Location Orientation: Right;Medial   Wound Image   (picture did not save)   Wound Etiology Other (Comment)   Dressing Status Intact   Cleansed Wound cleanser;Vashe   Dressing/Treatment Foam   Wound Length (cm) 3 cm   Wound Width (cm) 5 cm   Wound Depth (cm) 0.1 cm   Wound Surface Area (cm^2) 15 cm^2   Change in Wound Size % 23.08   Wound Volume (cm^3) 1.5 cm^3   Wound Healing % 23   Wound Assessment Superficial   Drainage Amount Scant   Drainage Description Serous   Wound Odor None   Margarette-Wound/Incision Assessment Blanchable erythema; Excoriated   Edges Flat/open edges   Wound Thickness Description Partial thickness   Wound Leg lower Posterior; Left 08/30/22   Date First Assessed/Time First Assessed: 08/30/22 1530   Present on Hospital Admission: Yes  Wound Approximate Age at First Assessment (Weeks): 5 weeks  Primary Wound Type: Venous Ulcer  Location: Leg lower  Wound Location Orientation: Posterior; Left . ..    Wound Image    Wound Etiology Venous   Dressing Status Intact   Wound Length (cm)   (clinically closed)   Wound Leg lower Posterior;Right 08/30/22   Date First Assessed/Time First Assessed: 08/30/22 1530   Wound Approximate Age at First Assessment (Weeks): 5 weeks  Primary Wound Type: Venous Ulcer  Location: Leg lower  Wound Location Orientation: Posterior;Right  Date of First Observation: 08/30/22   Wound Image    Wound Etiology Venous   Cleansed Wound cleanser;Vashe   Dressing/Treatment Alginate with Ag  (2 layer wrap)   Dressing Change Due 10/18/22   Wound Length (cm) 6 cm  (2 wounds combined, 2x3x0.1, 3x2x0.1)   Wound Width (cm) 6 cm   Wound Depth (cm) 0.1 cm   Wound Surface Area (cm^2) 36 cm^2   Change in Wound Size % 73.82   Wound Volume (cm^3) 3.6 cm^3   Wound Healing % 74   Post-Procedure Length (cm)   (debrided  + 0.1 for L,W,D)   Wound Assessment Devitalized tissue   Drainage Amount Scant   Drainage Description Serous   Wound Odor Mild   Margarette-Wound/Incision Assessment Edematous   Edges Flat/open edges   Wound Thickness Description Full thickness             Multilayer Compression Wrap   (Not Unna) Below the Knee    NAME:  Mamta Rutledge  YOB: 2000  MEDICAL RECORD NUMBER:  [id number]  DATE:  10/11/2022    Removed old Multilayer wrap if indicated and wash leg with mild soap/water. Applied moisturizing agent to dry skin as needed. Applied primary and secondary dressing as ordered. Applied multilayered dressing below the knee to right lower leg. Applied multilayered dressing below the knee to left lower leg. Instructed patient/caregiver not to remove dressing and to keep it clean and dry. Instructed patient/caregiver on complications to report to provider, such as pain, numbness in toes, heavy drainage, and slippage of dressing. Instructed patient on purpose of compression dressing and on activity and exercise recommendations.     Response to treatment: Well tolerated by patient       Electronically signed by Parish Myers RN on 10/12/2022 at 11:38 AM

## 2022-10-25 ENCOUNTER — HOSPITAL ENCOUNTER (OUTPATIENT)
Dept: WOUND CARE | Age: 22
Discharge: HOME | End: 2022-10-25
Attending: PODIATRIST

## 2022-10-25 DIAGNOSIS — I89.0 LYMPHEDEMA: Primary | ICD-10-CM

## 2022-10-25 DIAGNOSIS — S81.809A MULTIPLE OPEN WOUNDS OF LOWER LEG, INITIAL ENCOUNTER: ICD-10-CM

## 2022-10-25 DIAGNOSIS — L03.116 BILATERAL LOWER LEG CELLULITIS: ICD-10-CM

## 2022-10-25 DIAGNOSIS — L03.115 BILATERAL LOWER LEG CELLULITIS: ICD-10-CM

## 2022-10-25 PROCEDURE — 99213 OFFICE O/P EST LOW 20 MIN: CPT

## 2022-10-25 RX ORDER — LIDOCAINE HYDROCHLORIDE 20 MG/ML
JELLY TOPICAL ONCE
OUTPATIENT
Start: 2022-10-25 | End: 2022-10-25

## 2022-10-25 RX ORDER — LIDOCAINE HYDROCHLORIDE 40 MG/ML
SOLUTION TOPICAL ONCE
OUTPATIENT
Start: 2022-10-25 | End: 2022-10-25

## 2022-10-25 RX ORDER — CLOBETASOL PROPIONATE 0.5 MG/G
OINTMENT TOPICAL ONCE
OUTPATIENT
Start: 2022-10-25 | End: 2022-10-25

## 2022-10-25 RX ORDER — LIDOCAINE 50 MG/G
OINTMENT TOPICAL ONCE
OUTPATIENT
Start: 2022-10-25 | End: 2022-10-25

## 2022-10-25 RX ORDER — BACITRACIN ZINC AND POLYMYXIN B SULFATE 500; 1000 [USP'U]/G; [USP'U]/G
OINTMENT TOPICAL ONCE
OUTPATIENT
Start: 2022-10-25 | End: 2022-10-25

## 2022-10-25 RX ORDER — MUPIROCIN 20 MG/G
OINTMENT TOPICAL ONCE
OUTPATIENT
Start: 2022-10-25 | End: 2022-10-25

## 2022-10-25 RX ORDER — LIDOCAINE 40 MG/G
CREAM TOPICAL ONCE
OUTPATIENT
Start: 2022-10-25 | End: 2022-10-25

## 2022-10-26 NOTE — PROGRESS NOTES
[address]   Progress Note and Procedure Note   NO Procedure      Baark Peter  MEDICAL RECORD NUMBER:  [id number]  AGE: 25 y.o. RACE WHITE/NON-  GENDER: female  : 2000  EPISODE DATE:  10/25/2022    Subjective:     No chief complaint on file. HISTORY of PRESENT ILLNESS HPI    [email or web address] Rodríguez is a 25 y.o. female who presents today for wound/ulcer evaluation. History of Wound Context: chronic stasis ulcers  Wound/Ulcer Pain Timing/Severity: none  Quality of pain:   Severity:  0 / 10   Modifying Factors: None  Associated Signs/Symptoms: none    Ulcer Identification:  Ulcer Type: venous    Contributing Factors: edema, venous stasis, lymphedema, and obesity    Wound: 100% healed         PAST MEDICAL HISTORY    Past Medical History:   Diagnosis Date    Severe anxiety 2022    Super-super obese ([address]         PAST SURGICAL HISTORY    No past surgical history on file. FAMILY HISTORY    Family History   Problem Relation Age of Onset    Hypertension Mother     Anxiety disorder Mother        SOCIAL HISTORY    Social History     Tobacco Use    Smoking status: Never    Smokeless tobacco: Never   Vaping Use    Vaping Use: Never used   Substance Use Topics    Alcohol use: Never    Drug use: Never       ALLERGIES    No Known Allergies    MEDICATIONS    Current Outpatient Medications on File Prior to Encounter   Medication Sig Dispense Refill    citalopram (CELEXA) 10 mg tablet Take 1 Tablet by mouth daily. 30 Tablet 0    calcium carbonate (OS-ZHEN) 500 mg calcium (1,250 mg) tablet Take 1 Tablet by mouth three (3) times daily (with meals). 90 Tablet 1    metoprolol tartrate (LOPRESSOR) 25 mg tablet Take 0.5 Tablets by mouth every twelve (12) hours. 30 Tablet 0    nystatin (MYCOSTATIN) powder Apply  to affected area two (2) times a day.  120 g 0    acetaminophen (Tylenol Extra Strength) 500 mg tablet Take 1 Tablet by mouth every six (6) hours as needed for Pain. 30 Tablet 0     No current facility-administered medications on file prior to encounter. REVIEW OF SYSTEMS    Pertinent items are noted in HPI. Objective: There were no vitals taken for this visit. Wt Readings from Last 3 Encounters:   08/24/22 (!) 257 kg (566 lb 9.3 oz)       PHYSICAL EXAM    Pertinent items are noted in HPI. Assessment:      Problem List Items Addressed This Visit          Other    Bilateral lower leg cellulitis    Relevant Orders    INITIATE OUTPATIENT WOUND CARE PROTOCOL    Multiple open wounds of lower leg, initial encounter    Relevant Orders    INITIATE OUTPATIENT WOUND CARE PROTOCOL    Lymphedema - Primary    Relevant Orders    INITIATE OUTPATIENT WOUND CARE PROTOCOL       Procedure Note  Indications:  Based on my examination of this patient's wound(s)/ulcer(s) today, debridement is not required to promote healing and evaluate the wound base. Wound Calf Left 08/22/22 (Active)   Number of days: 65       Wound Calf Right 08/22/22 (Active)   Number of days: 65       Wound Leg lower Posterior; Left 08/30/22 (Active)   Wound Image   10/11/22 1500   Wound Etiology Venous 10/11/22 1500   Dressing Status Intact 10/11/22 1500   Cleansed Wound cleanser; Soap and water 09/27/22 1445   Dressing/Treatment Alginate;Dry dressing;Roll gauze 09/27/22 1445   Dressing Change Due 09/27/22 09/20/22 1630   Wound Length (cm) 1.5 cm 09/27/22 1445   Wound Width (cm) 1.5 cm 09/27/22 1445   Wound Depth (cm) 0.1 cm 09/27/22 1445   Wound Surface Area (cm^2) 2.25 cm^2 09/27/22 1445   Change in Wound Size % 99.42 09/27/22 1445   Wound Volume (cm^3) 0.225 cm^3 09/27/22 1445   Wound Healing % 99 09/27/22 1445   Post-Procedure Length (cm) 1.6 cm 09/27/22 1445   Post-Procedure Width (cm) 1.6 cm 09/27/22 1445   Post-Procedure Depth (cm) 0.2 cm 09/27/22 1445   Post-Procedure Surface Area (cm^2) 2.56 cm^2 09/27/22 1445   Post-Procedure Volume (cm^3) 0.512 cm^3 09/27/22 1445   Wound Assessment Devitalized tissue;Granulation tissue 09/27/22 1445   Drainage Amount Small 09/27/22 1445   Drainage Description Serosanguinous 09/27/22 1445   Wound Odor None 09/27/22 1445   Margarette-Wound/Incision Assessment Edematous 09/27/22 1445   Edges Defined edges 09/27/22 1445   Wound Thickness Description Full thickness 09/27/22 1445   Number of days: 57       Wound Leg lower Posterior;Right 08/30/22 (Active)   Wound Image   10/11/22 1500   Wound Etiology Venous 10/11/22 1500   Dressing Status Old drainage noted;New drainage noted 09/27/22 1445   Cleansed Wound cleanser;Vashe 10/11/22 1500   Dressing/Treatment Alginate with Ag 10/11/22 1500   Dressing Change Due 10/18/22 10/11/22 1500   Wound Length (cm) 6 cm 10/11/22 1500   Wound Width (cm) 6 cm 10/11/22 1500   Wound Depth (cm) 0.1 cm 10/11/22 1500   Wound Surface Area (cm^2) 36 cm^2 10/11/22 1500   Change in Wound Size % 73.82 10/11/22 1500   Wound Volume (cm^3) 3.6 cm^3 10/11/22 1500   Wound Healing % 74 10/11/22 1500   Post-Procedure Length (cm) 11.2 cm 09/27/22 1445   Post-Procedure Width (cm) 2.2 cm 09/27/22 1445   Post-Procedure Depth (cm) 0.2 cm 09/27/22 1445   Post-Procedure Surface Area (cm^2) 24.64 cm^2 09/27/22 1445   Post-Procedure Volume (cm^3) 4.928 cm^3 09/27/22 1445   Wound Assessment Devitalized tissue 10/11/22 1500   Drainage Amount Scant 10/11/22 1500   Drainage Description Serous 10/11/22 1500   Wound Odor Mild 10/11/22 1500   Margarette-Wound/Incision Assessment Edematous 10/11/22 1500   Edges Flat/open edges 10/11/22 1500   Wound Thickness Description Full thickness 10/11/22 1500   Number of days: 57       Wound Calf Right;Medial (Active)   Wound Image    09/27/22 1445   Wound Etiology Other (Comment) 10/11/22 1500   Dressing Status Intact 10/11/22 1500   Cleansed Wound cleanser;Vashe 10/11/22 1500   Dressing/Treatment Foam 10/11/22 1500   Wound Length (cm) 3 cm 10/11/22 1500   Wound Width (cm) 5 cm 10/11/22 1500   Wound Depth (cm) 0.1 cm 10/11/22 1500 Wound Surface Area (cm^2) 15 cm^2 10/11/22 1500   Change in Wound Size % 23.08 10/11/22 1500   Wound Volume (cm^3) 1.5 cm^3 10/11/22 1500   Wound Healing % 23 10/11/22 1500   Post-Procedure Length (cm) 3 cm 09/27/22 1445   Post-Procedure Width (cm) 6.5 cm 09/27/22 1445   Post-Procedure Depth (cm) 0.2 cm 09/27/22 1445   Post-Procedure Surface Area (cm^2) 19.5 cm^2 09/27/22 1445   Post-Procedure Volume (cm^3) 3.9 cm^3 09/27/22 1445   Wound Assessment Superficial 10/11/22 1500   Drainage Amount Scant 10/11/22 1500   Drainage Description Serous 10/11/22 1500   Wound Odor None 10/11/22 1500   Margarette-Wound/Incision Assessment Blanchable erythema; Excoriated 10/11/22 1500   Edges Flat/open edges 10/11/22 1500   Wound Thickness Description Partial thickness 10/11/22 1500   Number of days: 29        Plan:   Keep feet elevated, loose weight and OTC compression wraps    Discharged from care    Treatment Note please see attached Discharge Instructions    Written patient dismissal instructions given to patient or POA.          Electronically signed by Lisa Mendez DPM on 10/26/2022 at 9:32 AM

## 2022-10-27 VITALS
HEART RATE: 125 BPM | TEMPERATURE: 97.9 F | DIASTOLIC BLOOD PRESSURE: 90 MMHG | SYSTOLIC BLOOD PRESSURE: 148 MMHG | RESPIRATION RATE: 20 BRPM | OXYGEN SATURATION: 98 %

## 2022-10-27 NOTE — WOUND CARE
10/25/22 1500   Wound Leg lower Posterior; Left 08/30/22   Date First Assessed/Time First Assessed: 08/30/22 1530   Present on Hospital Admission: Yes  Wound Approximate Age at First Assessment (Weeks): 5 weeks  Primary Wound Type: Venous Ulcer  Location: Leg lower  Wound Location Orientation: Posterior; Left . ..    Wound Image    Wound Length (cm)   (clinically closed)   Wound Leg lower Posterior;Right 08/30/22   Date First Assessed/Time First Assessed: 08/30/22 1530   Wound Approximate Age at First Assessment (Weeks): 5 weeks  Primary Wound Type: Venous Ulcer  Location: Leg lower  Wound Location Orientation: Posterior;Right  Date of First Observation: 08/30/22   Wound Image    Wound Length (cm)   (clinically closed)             Patient discharged today, explained to patient that if any areas reopen to please call and set up new appointment to have wounds checked

## 2025-06-06 NOTE — PROGRESS NOTES
Reason for Admission:   Cellulitis and abscess of right leg [L03.115, L02.415]  Cellulitis and abscess of left leg [L03.116, L02.416]    PCP: None    There are currently no Active Isolations  No active infections                RUR Score:     12%             Resources/supports,as identified by patient/family:     Lives with mother and grandfather and states she has a fiance. Barriers to discharge:           Plan for utilizing home health:    yes-anticipate possible IV ABX                          Likelihood of readmission:   12%         Transition of Care Plan: anticipate home with [facility]               Initial assessment completed with patient. Cognitive status of patient: oriented to time, place, person and situation. Face sheet information confirmed:  yes. The patient designates her sister Luis Angel Blank to participate in her discharge plan and to receive any needed information. This patient lives in a single family home with patient, mother, and grandfather. Patient is able to navigate steps as needed. Prior to hospitalization, patient was considered to be independent with ADLs/IADLS : yes . The patient and sister will be available to transport patient home upon discharge. The patient already has none reported, medical equipment available in the home. Patient is not currently active with home health. IPatient has not stayed in a skilled nursing facility or rehab. Was  stay within last 60 days : no. This patient is on dialysis/days :no    The patient is open to [facility] of recommended. Currently, the discharge plan is Home with [address] [place] [address] [place]. The patient states that she can obtain her medications from the pharmacy, and take her medications as directed. Patient's current insurance is Payor: /        Care Management Interventions  PCP Verified by CM:  Yes (does not have one, wants one set up)  Mode of Transport at Discharge:  (sister will drive her)  Transition of Care Consult (CM Consult): . Discharge Planning  Support Systems: Parent(s), Other Family Member(s), Spouse/Significant Other (has fiance)  The Plan for Transition of Care is Related to the Following Treatment Goals : home with [facility] and possible IV ABX  Discharge Location  Patient Expects to be Discharged to[de-identified] Home with home health